# Patient Record
Sex: FEMALE | Race: WHITE | NOT HISPANIC OR LATINO | Employment: UNEMPLOYED | URBAN - NONMETROPOLITAN AREA
[De-identification: names, ages, dates, MRNs, and addresses within clinical notes are randomized per-mention and may not be internally consistent; named-entity substitution may affect disease eponyms.]

---

## 2017-01-04 ENCOUNTER — DOCUMENTATION (OUTPATIENT)
Dept: NEUROLOGY | Facility: CLINIC | Age: 42
End: 2017-01-04

## 2017-01-04 RX ORDER — LACOSAMIDE 100 MG/1
100 TABLET ORAL NIGHTLY
Qty: 30 TABLET | Refills: 2 | Status: SHIPPED | OUTPATIENT
Start: 2017-01-04 | End: 2017-03-24 | Stop reason: SDUPTHER

## 2017-01-04 RX ORDER — LACOSAMIDE 200 MG/1
200 TABLET ORAL EVERY 12 HOURS SCHEDULED
Qty: 60 TABLET | Refills: 5 | Status: SHIPPED | OUTPATIENT
Start: 2017-01-04 | End: 2017-01-04 | Stop reason: SDUPTHER

## 2017-01-04 RX ORDER — LACOSAMIDE 200 MG/1
200 TABLET ORAL EVERY 12 HOURS SCHEDULED
Qty: 60 TABLET | Refills: 2 | Status: SHIPPED | OUTPATIENT
Start: 2017-01-04 | End: 2017-03-24 | Stop reason: SDUPTHER

## 2017-02-28 RX ORDER — DIVALPROEX SODIUM 125 MG/1
500 CAPSULE, COATED PELLETS ORAL 2 TIMES DAILY
Qty: 240 CAPSULE | Refills: 4 | Status: SHIPPED | OUTPATIENT
Start: 2017-02-28 | End: 2017-05-02 | Stop reason: SDUPTHER

## 2017-03-24 RX ORDER — LACOSAMIDE 100 MG/1
100 TABLET ORAL NIGHTLY
Qty: 30 TABLET | Refills: 5 | Status: SHIPPED | OUTPATIENT
Start: 2017-03-24 | End: 2017-05-02 | Stop reason: SDUPTHER

## 2017-03-24 RX ORDER — LACOSAMIDE 200 MG/1
200 TABLET ORAL EVERY 12 HOURS SCHEDULED
Qty: 60 TABLET | Refills: 5 | Status: SHIPPED | OUTPATIENT
Start: 2017-03-24 | End: 2017-05-02 | Stop reason: SDUPTHER

## 2017-04-03 DIAGNOSIS — G40.909 SEIZURE DISORDER (HCC): ICD-10-CM

## 2017-04-03 RX ORDER — FOLIC ACID 1 MG/1
1 TABLET ORAL DAILY
Qty: 30 TABLET | Refills: 6 | Status: SHIPPED | OUTPATIENT
Start: 2017-04-03 | End: 2017-05-02 | Stop reason: SDUPTHER

## 2017-05-02 ENCOUNTER — OFFICE VISIT (OUTPATIENT)
Dept: NEUROLOGY | Facility: CLINIC | Age: 42
End: 2017-05-02

## 2017-05-02 VITALS
HEART RATE: 74 BPM | WEIGHT: 145 LBS | SYSTOLIC BLOOD PRESSURE: 118 MMHG | HEIGHT: 63 IN | DIASTOLIC BLOOD PRESSURE: 80 MMHG | BODY MASS INDEX: 25.69 KG/M2

## 2017-05-02 DIAGNOSIS — E03.9 HYPOTHYROIDISM, UNSPECIFIED TYPE: ICD-10-CM

## 2017-05-02 DIAGNOSIS — Z86.19 HX OF TOXOPLASMOSIS: ICD-10-CM

## 2017-05-02 DIAGNOSIS — R56.9 GENERALIZED CONVULSIVE SEIZURES (HCC): Primary | ICD-10-CM

## 2017-05-02 DIAGNOSIS — G40.909 SEIZURE DISORDER (HCC): ICD-10-CM

## 2017-05-02 PROCEDURE — 99213 OFFICE O/P EST LOW 20 MIN: CPT | Performed by: CLINICAL NURSE SPECIALIST

## 2017-05-02 RX ORDER — LACOSAMIDE 200 MG/1
200 TABLET ORAL EVERY 12 HOURS SCHEDULED
Qty: 60 TABLET | Refills: 5 | Status: SHIPPED | OUTPATIENT
Start: 2017-05-02 | End: 2017-10-26 | Stop reason: SDUPTHER

## 2017-05-02 RX ORDER — LACOSAMIDE 100 MG/1
100 TABLET ORAL NIGHTLY
Qty: 30 TABLET | Refills: 5 | Status: SHIPPED | OUTPATIENT
Start: 2017-05-02 | End: 2017-10-04 | Stop reason: SDUPTHER

## 2017-05-02 RX ORDER — LACOSAMIDE 200 MG/1
200 TABLET ORAL EVERY 12 HOURS SCHEDULED
Qty: 60 TABLET | Refills: 5 | Status: SHIPPED | OUTPATIENT
Start: 2017-05-02 | End: 2017-05-02 | Stop reason: SDUPTHER

## 2017-05-02 RX ORDER — DIVALPROEX SODIUM 125 MG/1
500 CAPSULE, COATED PELLETS ORAL 2 TIMES DAILY
Qty: 240 CAPSULE | Refills: 5 | Status: SHIPPED | OUTPATIENT
Start: 2017-05-02 | End: 2017-11-17 | Stop reason: SDUPTHER

## 2017-05-02 RX ORDER — FOLIC ACID 1 MG/1
1 TABLET ORAL DAILY
Qty: 30 TABLET | Refills: 6 | Status: SHIPPED | OUTPATIENT
Start: 2017-05-02 | End: 2017-11-27 | Stop reason: SDUPTHER

## 2017-09-14 ENCOUNTER — OFFICE VISIT (OUTPATIENT)
Dept: FAMILY MEDICINE CLINIC | Facility: CLINIC | Age: 42
End: 2017-09-14

## 2017-09-14 VITALS
OXYGEN SATURATION: 97 % | HEART RATE: 59 BPM | HEIGHT: 63 IN | WEIGHT: 122 LBS | TEMPERATURE: 97.6 F | DIASTOLIC BLOOD PRESSURE: 68 MMHG | BODY MASS INDEX: 21.62 KG/M2 | RESPIRATION RATE: 16 BRPM | SYSTOLIC BLOOD PRESSURE: 104 MMHG

## 2017-09-14 DIAGNOSIS — R32 URINARY INCONTINENCE, UNSPECIFIED TYPE: ICD-10-CM

## 2017-09-14 DIAGNOSIS — K59.00 CONSTIPATION, UNSPECIFIED CONSTIPATION TYPE: ICD-10-CM

## 2017-09-14 DIAGNOSIS — E03.9 ACQUIRED HYPOTHYROIDISM: Primary | ICD-10-CM

## 2017-09-14 DIAGNOSIS — R15.9 INCONTINENT OF FECES: ICD-10-CM

## 2017-09-14 DIAGNOSIS — IMO0001 WHEEL CHAIR AS AMBULATORY AID: ICD-10-CM

## 2017-09-14 DIAGNOSIS — G40.909 SEIZURE DISORDER (HCC): ICD-10-CM

## 2017-09-14 DIAGNOSIS — J30.89 SEASONAL ALLERGIC RHINITIS DUE TO OTHER ALLERGIC TRIGGER: ICD-10-CM

## 2017-09-14 PROCEDURE — 99203 OFFICE O/P NEW LOW 30 MIN: CPT | Performed by: FAMILY MEDICINE

## 2017-09-14 RX ORDER — FACIAL-BODY WIPES
EACH TOPICAL
Qty: 80 EACH | Refills: 12 | Status: SHIPPED | OUTPATIENT
Start: 2017-09-14

## 2017-09-14 RX ORDER — MULTIVITAMIN/IRON/FOLIC ACID 18MG-0.4MG
1 TABLET ORAL DAILY
Qty: 90 TABLET | Refills: 2 | Status: SHIPPED | OUTPATIENT
Start: 2017-09-14 | End: 2018-12-18

## 2017-09-14 RX ORDER — DIVALPROEX SODIUM 125 MG/1
500 CAPSULE, COATED PELLETS ORAL 2 TIMES DAILY
Qty: 240 CAPSULE | Refills: 5 | Status: CANCELLED | OUTPATIENT
Start: 2017-09-14

## 2017-09-14 RX ORDER — CETIRIZINE HYDROCHLORIDE 10 MG/1
10 TABLET ORAL DAILY
Qty: 90 TABLET | Refills: 2 | Status: SHIPPED | OUTPATIENT
Start: 2017-09-14 | End: 2017-10-26 | Stop reason: SDUPTHER

## 2017-09-14 RX ORDER — DIAPER,BRIEF,ADULT, DISPOSABLE
EACH MISCELLANEOUS
Qty: 32 EACH | Refills: 12 | Status: SHIPPED | OUTPATIENT
Start: 2017-09-14

## 2017-09-14 RX ORDER — POLYETHYLENE GLYCOL 3350 17 G/17G
17 POWDER, FOR SOLUTION ORAL DAILY PRN
Qty: 100 EACH | Refills: 1 | Status: SHIPPED | OUTPATIENT
Start: 2017-09-14 | End: 2017-12-01 | Stop reason: SDUPTHER

## 2017-09-14 RX ORDER — FOLIC ACID 1 MG/1
1 TABLET ORAL DAILY
Qty: 30 TABLET | Refills: 6 | Status: CANCELLED | OUTPATIENT
Start: 2017-09-14

## 2017-09-14 RX ORDER — LEVOTHYROXINE SODIUM 0.03 MG/1
25 TABLET ORAL DAILY
Qty: 90 TABLET | Refills: 2 | Status: SHIPPED | OUTPATIENT
Start: 2017-09-14 | End: 2018-03-16 | Stop reason: SDUPTHER

## 2017-09-14 RX ORDER — POLYETHYLENE GLYCOL 3350 17 G/17G
17 POWDER, FOR SOLUTION ORAL DAILY
Status: CANCELLED | OUTPATIENT
Start: 2017-09-14

## 2017-09-14 NOTE — PROGRESS NOTES
Chief Complaint   Patient presents with   • Med Refill     Patient needs medication refills . I have pended the medication for you. she is wanting to change the dosage of the miralax         History:  Nely Abbasi is a 41 y.o. female presents who today for evaluation of the above problems.  PCP currently listed as Shreyas Garcia MD.   Here with foster mother today Elsa Lulal.  Patient is new to practice. She has been going to [facility] and they are switching her over.  She needs refills of medications as listed to the side.  Mother had bad pregnancy, a lot of bleeding. Mother ended up with toxo while pregnant.  Patient has no issues at present, history of this problem, treated as youth.  The patient was in institute for 30 yr, from age 9 until 2014.  She flaps her seatbelt and rock/turn.  She will tear up paper at home. Has tried sensory objects.  Thurman did this for 30 years.  She will not do hand clappers.  Will not use fidget spinner or fidget cubes.  She likes to shake her seatbetl.  This will entertain her.  At home she will tear them and flap them and then do this repeatedly.  100% of history obtained from foster mother, patient is not verbal.  Never been sexually active.  Not considering paps/pelvics/screening evaluation.  Declines flu vaccine today. She is able to walk, does not like to follow.  Land snot do well and she will stand up and spin.  She hates to change her day. If going to restaurant not using chair.  She will go in and sit and eat.  If she has to wait she will get frustrated and will pull hair. Mother is guardian, goes through agency for current foster mother to have permission to be seen.  Patient has no c/o today.  Unknown ability to discover pain as patient does not really communicate.  Wears adult diapers. USes multiple per day. Neuro refills vimpat, depakote and folic acid. Last TSh 1.16 2016.     Nely Abbasi  has a past medical history of Seizures and  Toxoplasmosis.    Allergies   Allergen Reactions   • Keppra [Levetiracetam]    • Lamictal [Lamotrigine]    • Topamax [Topiramate]      Past Medical History:   Diagnosis Date   • Seizures    • Toxoplasmosis      Past Surgical History:   Procedure Laterality Date   • CHOLECYSTECTOMY       Family History   Problem Relation Age of Onset   • No Known Problems Mother    • No Known Problems Father    • No Known Problems Sister    • No Known Problems Sister    • No Known Problems Maternal Grandmother    • No Known Problems Maternal Grandfather    • No Known Problems Paternal Grandmother    • No Known Problems Paternal Grandfather        Current Outpatient Prescriptions on File Prior to Visit   Medication Sig Dispense Refill   • Calcium Carbonate-Vitamin D (CALCIUM 600+D3 PO) Take  by mouth daily.     • cetirizine (ZyrTEC) 10 MG tablet Take 10 mg by mouth daily.     • Divalproex Sodium (DEPAKOTE SPRINKLE) 125 MG capsule Take 4 capsules by mouth 2 (Two) Times a Day. 240 capsule 5   • folic acid (FOLVITE) 1 MG tablet Take 1 tablet by mouth Daily. 30 tablet 6   • lacosamide (VIMPAT) 100 MG tablet tablet Take 1 tablet by mouth Every Night. 30 tablet 5   • lacosamide (VIMPAT) 200 MG tablet Take 1 tablet by mouth Every 12 (Twelve) Hours. 60 tablet 5   • levothyroxine (SYNTHROID, LEVOTHROID) 25 MCG tablet Take 25 mcg by mouth daily.     • Multiple Vitamins-Minerals (CENTRUM ADULTS PO) Take  mg by mouth daily.     • polyethylene glycol (MIRALAX) packet Take 17 g by mouth daily.       No current facility-administered medications on file prior to visit.        Family history, surgical history, past medical history, Allergies and meds reviewed with patient today and updated in [facility] EMR.     ROS:  Review of Systems  Patient is not able to communicate.  No ability to obtain ROS.  Foster mother notes that she is her usual self.  Slapping belt strap in between hands/clapping.  Turning head side to side. Completely non communicative.  "    OBJECTIVE:  Vitals:    09/14/17 1343   BP: 104/68   Pulse: 59   Resp: 16   Temp: 97.6 °F (36.4 °C)   SpO2: 97%   Weight: 122 lb (55.3 kg)   Height: 63\" (160 cm)     Physical Exam   Constitutional: She appears well-developed and well-nourished. No distress.   HENT:   Head: Normocephalic and atraumatic.   Right Ear: External ear normal.   Left Ear: External ear normal.   Nose: Nose normal.   Mouth/Throat: Oropharynx is clear and moist.   Eyes: Conjunctivae and EOM are normal. Pupils are equal, round, and reactive to light.   Neck: Normal range of motion. Neck supple. No thyromegaly present.   Cardiovascular: Normal rate, regular rhythm, normal heart sounds and intact distal pulses.    Pulmonary/Chest: Effort normal and breath sounds normal. No respiratory distress. She has no rales. She exhibits no tenderness.   Abdominal: Soft. Bowel sounds are normal. There is no tenderness. There is no rebound and no guarding.   Musculoskeletal: Normal range of motion. She exhibits no tenderness.   MOst of exam completed in WC.  She has very little motion, very little mobility.  Tears up paper/ slaps seatbelt.     Lymphadenopathy:     She has no cervical adenopathy.     She has no axillary adenopathy.   Neurological: She is alert. She has normal strength and normal reflexes. No sensory deficit. Coordination normal.   Skin: Skin is warm and dry. No rash noted. She is not diaphoretic.   Psychiatric: She is slowed. Cognition and memory are impaired. She is noncommunicative.   No eye contact.  No communication. Unable to follow commands.  She is able to ambulate but not able to converse/interact. .  She claps her seatbelt between hands.  Makes some squawking like noises.    Nursing note and vitals reviewed.    Assessment/Plan:  Very limited ability to obtain information. Exam for heent, GI, Pulm, Cardiology appears normal.  She does not appear to be in distress.  Chronic inability to communicate, seizures by history, hypothyroid by " history, status unknown.  She is currently on synthroid.  Foster mother did not want any labs today.  She needs Rx for adult daipers, wipes and miralax.  Additionally she needs refills of her zyrtec, calcium +D, very difficult history/examination with patient history.  Neuro refills depakote. No recent seizures.  She is essentially stable.     Orders Placed Today:  Nely was seen today for med refill.    Diagnoses and all orders for this visit:    Acquired hypothyroidism  -     levothyroxine (SYNTHROID, LEVOTHROID) 25 MCG tablet; Take 1 tablet by mouth Daily.    Seizure disorder    Incontinent of feces  -     Incontinence Supply Disposable (DEPEND UNDERWEAR SM/MED) misc; 6 briefs per day  -     Infant Care Products (BABY WIPES) misc; 1 pack per week.    Urinary incontinence, unspecified type  -     Incontinence Supply Disposable (DEPEND UNDERWEAR SM/MED) misc; 6 briefs per day  -     Infant Care Products (BABY WIPES) misc; 1 pack per week.    Constipation, unspecified constipation type  -     polyethylene glycol (MIRALAX) packet; Take 17 g by mouth Daily As Needed (constipation).    Wheel chair as ambulatory aid    Seasonal allergic rhinitis due to other allergic trigger  -     cetirizine (zyrTEC) 10 MG tablet; Take 1 tablet by mouth Daily.    Other orders  -     Divalproex Sodium (DEPAKOTE SPRINKLE) 125 MG capsule; Take 4 capsules by mouth 2 (Two) Times a Day.  -     : folic acid (FOLVITE) 1 MG tablet; Take 1 tablet by mouth Daily.  -    polyethylene glycol (MIRALAX) packet; Take 17 g by mouth Daily.  -     Calcium Carb-Cholecalciferol (CALCIUM 600+D3) 600-200 MG-UNIT tablet; Take 1 tablet by mouth Daily.  -     Multiple Vitamins-Minerals (CENTRUM ADULTS) tablet; Take 1 each by mouth Daily.        Risks/benefits of current and new medications discussed with the patient and or family today.  The patient/family are aware and accept that if there any side effects they should call or return to clinic as soon as  possible.  Appropriate F/U discussed for topics addressed today. All questions were answered to the satisfactory state of patient/family.  Should symptoms fail to improve or worsen they agree to call or return to clinic or to go to the ER. Education handouts were offered on any new Rx if requested.  Discussed the importance of following up with any needed screening tests/labs/specialist appointments and any requested follow-up recommended by me today.  Importance of maintaining follow-up discussed and patient accepts that missed appointments can delay diagnosis and potentially lead to worsening of conditions.    An After Visit Summary was printed and given to the patient at discharge.  Follow-up: Return if symptoms worsen or fail to improve.         Shreyas Garcia M.D. 9/14/2017

## 2017-10-04 RX ORDER — LACOSAMIDE 100 MG/1
TABLET, FILM COATED ORAL
Qty: 30 TABLET | Refills: 2 | Status: SHIPPED | OUTPATIENT
Start: 2017-10-04 | End: 2017-10-26 | Stop reason: SDUPTHER

## 2017-10-26 RX ORDER — LACOSAMIDE 100 MG/1
100 TABLET ORAL
Qty: 30 TABLET | Refills: 2 | Status: SHIPPED | OUTPATIENT
Start: 2017-10-26 | End: 2018-01-24 | Stop reason: SDUPTHER

## 2017-10-26 RX ORDER — LACOSAMIDE 200 MG/1
200 TABLET ORAL EVERY 12 HOURS SCHEDULED
Qty: 60 TABLET | Refills: 2 | Status: SHIPPED | OUTPATIENT
Start: 2017-10-26 | End: 2018-01-24 | Stop reason: SDUPTHER

## 2017-10-26 RX ORDER — CETIRIZINE HYDROCHLORIDE 10 MG/1
10 TABLET ORAL DAILY
Qty: 90 TABLET | Refills: 2 | Status: SHIPPED | OUTPATIENT
Start: 2017-10-26 | End: 2018-11-07 | Stop reason: SDUPTHER

## 2017-11-17 RX ORDER — DIVALPROEX SODIUM 125 MG/1
CAPSULE, COATED PELLETS ORAL
Qty: 240 CAPSULE | Refills: 5 | Status: SHIPPED | OUTPATIENT
Start: 2017-11-17 | End: 2018-05-22 | Stop reason: SDUPTHER

## 2017-11-20 ENCOUNTER — OFFICE VISIT (OUTPATIENT)
Dept: NEUROLOGY | Facility: CLINIC | Age: 42
End: 2017-11-20

## 2017-11-20 VITALS
HEIGHT: 63 IN | HEART RATE: 64 BPM | DIASTOLIC BLOOD PRESSURE: 68 MMHG | WEIGHT: 122 LBS | SYSTOLIC BLOOD PRESSURE: 110 MMHG | BODY MASS INDEX: 21.62 KG/M2

## 2017-11-20 DIAGNOSIS — R56.9 GENERALIZED CONVULSIVE SEIZURES (HCC): Primary | ICD-10-CM

## 2017-11-20 DIAGNOSIS — Z79.899 MEDICATION MANAGEMENT: ICD-10-CM

## 2017-11-20 PROCEDURE — 99213 OFFICE O/P EST LOW 20 MIN: CPT | Performed by: CLINICAL NURSE SPECIALIST

## 2017-11-20 NOTE — PATIENT INSTRUCTIONS
Epilepsy  Epilepsy is a disorder in which a person has repeated seizures over time. A seizure is a release of abnormal electrical activity in the brain. Seizures can cause a change in attention, behavior, or the ability to remain awake and alert (altered mental status). Seizures often involve uncontrollable shaking (convulsions).   Most people with epilepsy lead normal lives. However, people with epilepsy are at an increased risk of falls, accidents, and injuries. Therefore, it is important to begin treatment right away.  CAUSES   Epilepsy has many possible causes. Anything that disturbs the normal pattern of brain cell activity can lead to seizures. This may include:   · Head injury.  · Birth trauma.  · High fever as a child.  · Stroke.  · Bleeding into or around the brain.  · Certain drugs.  · Prolonged low oxygen, such as what occurs after CPR efforts.  · Abnormal brain development.  · Certain illnesses, such as meningitis, encephalitis (brain infection), malaria, and other infections.  · An imbalance of nerve signaling chemicals (neurotransmitters).    SIGNS AND SYMPTOMS   The symptoms of a seizure can vary greatly from one person to another. Right before a seizure, you may have a warning (aura) that a seizure is about to occur. An aura may include the following symptoms:  · Fear or anxiety.  · Nausea.  · Feeling like the room is spinning (vertigo).  · Vision changes, such as seeing flashing lights or spots.  Common symptoms during a seizure include:  · Abnormal sensations, such as an abnormal smell or a bitter taste in the mouth.    · Sudden, general body stiffness.    · Convulsions that involve rhythmic jerking of the face, arm, or leg on one or both sides.    · Sudden change in consciousness.      Appearing to be awake but not responding.      Appearing to be asleep but cannot be awakened.    · Grimacing, chewing, lip smacking, drooling, tongue biting, or loss of bowel or bladder control.  After a seizure,  you may feel sleepy for a while.   DIAGNOSIS   Your health care provider will ask about your symptoms and take a medical history. Descriptions from any witnesses to your seizures will be very helpful in the diagnosis. A physical exam, including a detailed neurological exam, is necessary. Various tests may be done, such as:   · An electroencephalogram (EEG). This is a painless test of your brain waves. In this test, a diagram is created of your brain waves. These diagrams can be interpreted by a specialist.  · An MRI of the brain.    · A CT scan of the brain.    · A spinal tap (lumbar puncture, LP).  · Blood tests to check for signs of infection or abnormal blood chemistry.  TREATMENT   There is no cure for epilepsy, but it is generally treatable. Once epilepsy is diagnosed, it is important to begin treatment as soon as possible. For most people with epilepsy, seizures can be controlled with medicines. The following may also be used:  · A pacemaker for the brain (vagus nerve stimulator) can be used for people with seizures that are not well controlled by medicine.  · Surgery on the brain.  For some people, epilepsy eventually goes away.  HOME CARE INSTRUCTIONS   ·  Follow your health care provider's recommendations on driving and safety in normal activities.  · Get enough rest. Lack of sleep can cause seizures.  · Only take over-the-counter or prescription medicines as directed by your health care provider. Take any prescribed medicine exactly as directed.  · Avoid any known triggers of your seizures.  · Keep a seizure diary. Record what you recall about any seizure, especially any possible trigger.    · Make sure the people you live and work with know that you are prone to seizures. They should receive instructions on how to help you. In general, a witness to a seizure should:      Cushion your head and body.      Turn you on your side.      Avoid unnecessarily restraining you.      Not place anything inside your  mouth.      Call for emergency medical help if there is any question about what has occurred.    · Follow up with your health care provider as directed. You may need regular blood tests to monitor the levels of your medicine.    SEEK MEDICAL CARE IF:   · You develop signs of infection or other illness. This might increase the risk of a seizure.    · You seem to be having more frequent seizures.    · Your seizure pattern is changing.    SEEK IMMEDIATE MEDICAL CARE IF:   · You have a seizure that does not stop after a few moments.    · You have a seizure that causes any difficulty in breathing.    · You have a seizure that results in a very severe headache.    · You have a seizure that leaves you with the inability to speak or use a part of your body.       This information is not intended to replace advice given to you by your health care provider. Make sure you discuss any questions you have with your health care provider.     Document Released: 12/18/2006 Document Revised: 04/10/2017 Document Reviewed: 07/30/2014  Elsevier Interactive Patient Education ©2017 [employer]

## 2017-11-20 NOTE — PROGRESS NOTES
"Subjective     Chief Complaint   Patient presents with   • Seizures     No sz. Questions about \"the laughing seizure\"       Nely Abbasi is a 42 y.o. female right handed on disability.  She is here today for follow up for seizures. She was last seen 5/2017. She is accompanied by her caregiver. She has history of living in an institute from age 9 for 30 years.  As far as the caregiver is aware the patient has not had a seizure since 2015. She does tell me the patient has had two episodes of jocularity. One occurred in her sleep. The other occurred during day time and she had a laughing spell followed by a 10 second or less blank stare and then returned to baseline. She has no new complaints today.  Last labs were 5/2017 but I do not have those results       HPI Comments: According to the caregiver, the patient was institutionalized at a facility in [place], [place] at age 9 for about 30 years and then sent to a group home.    Seizures    This is a chronic problem. Episode onset: last reported seizure 2015. Associated symptoms include sleepiness, confusion and visual disturbances (dysconjugate gaze). Pertinent negatives include no headaches, no cough, no nausea, no vomiting and no diarrhea. Characteristics include rhythmic jerking and loss of consciousness.        Current Outpatient Prescriptions   Medication Sig Dispense Refill   • Calcium Carb-Cholecalciferol (CALCIUM 600+D3) 600-200 MG-UNIT tablet Take 1 tablet by mouth Daily. 90 each 2   • cetirizine (zyrTEC) 10 MG tablet Take 1 tablet by mouth Daily. 90 tablet 2   • Divalproex Sodium (DEPAKOTE SPRINKLE) 125 MG capsule TAKE 4 CAPSULES BY MOUTH 2 (TWO) TIMES A DAY. 240 capsule 5   • folic acid (FOLVITE) 1 MG tablet Take 1 tablet by mouth Daily. 30 tablet 6   • Incontinence Supply Disposable (DEPEND UNDERWEAR SM/MED) misc 6 briefs per day 32 each 12   • Infant Care Products (BABY WIPES) misc 1 pack per week. 80 each 12   • lacosamide (VIMPAT) 100 MG tablet tablet " "Take 1 tablet by mouth every night at bedtime. 30 tablet 2   • lacosamide (VIMPAT) 200 MG tablet Take 1 tablet by mouth Every 12 (Twelve) Hours. 60 tablet 2   • levothyroxine (SYNTHROID, LEVOTHROID) 25 MCG tablet Take 1 tablet by mouth Daily. 90 tablet 2   • Multiple Vitamins-Minerals (CENTRUM ADULTS) tablet Take 1 each by mouth Daily. 90 tablet 2   • polyethylene glycol (MIRALAX) packet Take 17 g by mouth Daily As Needed (constipation). 100 each 1     No current facility-administered medications for this visit.        Past Medical History:   Diagnosis Date   • Blind    • Developmental delay    • Seizure    • Seizures    • Toxoplasmosis    • Toxoplasmosis        No past surgical history on file.    family history includes No Known Problems in her father, maternal grandfather, maternal grandmother, mother, paternal grandfather, paternal grandmother, sister, and sister.    Social History   Substance Use Topics   • Smoking status: Never Smoker   • Smokeless tobacco: Never Used   • Alcohol use No       Review of Systems   Constitutional: Negative for fatigue and fever.   HENT: Negative for rhinorrhea and trouble swallowing.    Eyes: Positive for visual disturbance (dysconjugate gaze).   Respiratory: Negative for cough and choking.    Cardiovascular: Negative for leg swelling.   Gastrointestinal: Positive for constipation. Negative for diarrhea, nausea and vomiting.   Genitourinary: Negative for dysuria and frequency.        Hx of incontinence. Wears depends   Musculoskeletal: Positive for gait problem. Negative for arthralgias and myalgias.   Neurological: Positive for seizures and loss of consciousness. Negative for dizziness, weakness and headaches.   Hematological: Negative for adenopathy.   Psychiatric/Behavioral: Positive for confusion. Negative for agitation and hallucinations.       Objective     /68  Pulse 64  Ht 63\" (160 cm)  Wt 122 lb (55.3 kg)  BMI 21.61 kg/m2, Body mass index is 21.61 " kg/(m^2).    Physical Exam   Constitutional: She appears well-developed and well-nourished.   HENT:   Head: Normocephalic.   Right Ear: External ear normal.   Left Ear: External ear normal.   Nose: Nose normal.   Mouth/Throat: Oropharynx is clear and moist.   Eyes: Conjunctivae are normal. Pupils are equal, round, and reactive to light. Right eye exhibits no nystagmus. Left eye exhibits no nystagmus.   Dysconjugate gaze   Neck: Normal range of motion. Neck supple. No JVD present.   Cardiovascular: Normal rate, regular rhythm and normal heart sounds.  Exam reveals no gallop and no friction rub.    No murmur heard.  Pulmonary/Chest: Effort normal and breath sounds normal. She has no wheezes. She has no rales.   Abdominal: Soft. Bowel sounds are normal. There is no tenderness.   Musculoskeletal: She exhibits no edema.   Lymphadenopathy:     She has no cervical adenopathy.   Neurological: She is alert. She is disoriented (non verbal, not able to follow commands). She displays no tremor. No cranial nerve deficit.   Reflex Scores:       Tricep reflexes are 1+ on the right side and 1+ on the left side.       Bicep reflexes are 1+ on the right side and 1+ on the left side.       Brachioradialis reflexes are 1+ on the right side and 1+ on the left side.       Patellar reflexes are 1+ on the right side and 1+ on the left side.  Moves upper and lower extremities, caregiver reports she can walk, but in a wheelchair today  Caregiver states she can feed herself some food types   Skin: Skin is warm and dry.   Psychiatric:   Sitting in wheelchair, moving a strap constantly, unable to follow directions            ASSESSMENT/PLAN    Diagnoses and all orders for this visit:    Generalized convulsive seizures    Medication management  -     Valproic Acid Level, Total; Future  -     Lacosamide Blood; Future  -     CBC & Differential; Future  -     Comprehensive Metabolic Panel; Future      MEDICAL DECISION MAKIN. Continue with  depakote sprinkles 125 mg 4 capsules BID  2. Continue with vimpat 200 mg BID and 100mg at HS  3. Obtain labs, depakote level, vimpat level, CBC, CMP, done 5/2017 and repeat labs again and ok to be done at PCP office.   4. Seizure precautions were discussed to include no tub baths, no swimming, avoiding lack of sleep, and avoiding known triggers. Education given of things that may contribute to a seizure to include, but not limited to: stressful situations, fever, fatigue, lack of sleep, low blood sugar, hyperventilation, flashing lights, and caffeine. Instructions given to take seizure medications as prescribed. Education given to family member on what to do during a seizure and care following the seizure. Education given to contact this office prior to stopping or changing any medications.  5. If episodes of jocularity become more frequent, caregiver is to let me know.  6. Does not use tobacco  7. Healthy BMI   allergies and all known medications/prescriptions have been reviewed using resources available on this encounter.    Return in about 6 months (around 5/20/2018).        SOWMYA Colby

## 2017-11-27 DIAGNOSIS — G40.909 SEIZURE DISORDER (HCC): ICD-10-CM

## 2017-11-27 RX ORDER — FOLIC ACID 1 MG/1
TABLET ORAL
Qty: 30 TABLET | Refills: 11 | Status: SHIPPED | OUTPATIENT
Start: 2017-11-27 | End: 2019-01-22 | Stop reason: SDUPTHER

## 2017-12-01 ENCOUNTER — OFFICE VISIT (OUTPATIENT)
Dept: FAMILY MEDICINE CLINIC | Facility: CLINIC | Age: 42
End: 2017-12-01

## 2017-12-01 VITALS
TEMPERATURE: 97.8 F | SYSTOLIC BLOOD PRESSURE: 116 MMHG | DIASTOLIC BLOOD PRESSURE: 80 MMHG | OXYGEN SATURATION: 99 % | RESPIRATION RATE: 16 BRPM | HEIGHT: 63 IN | HEART RATE: 99 BPM | WEIGHT: 114.2 LBS | BODY MASS INDEX: 20.23 KG/M2

## 2017-12-01 DIAGNOSIS — Z79.899 MEDICATION MANAGEMENT: ICD-10-CM

## 2017-12-01 DIAGNOSIS — R56.9 GENERALIZED CONVULSIVE SEIZURES (HCC): ICD-10-CM

## 2017-12-01 DIAGNOSIS — Z76.89 ESTABLISHING CARE WITH NEW DOCTOR, ENCOUNTER FOR: ICD-10-CM

## 2017-12-01 DIAGNOSIS — E03.9 ACQUIRED HYPOTHYROIDISM: Primary | ICD-10-CM

## 2017-12-01 DIAGNOSIS — K59.00 CONSTIPATION, UNSPECIFIED CONSTIPATION TYPE: ICD-10-CM

## 2017-12-01 PROCEDURE — 99213 OFFICE O/P EST LOW 20 MIN: CPT | Performed by: FAMILY MEDICINE

## 2017-12-01 RX ORDER — POLYETHYLENE GLYCOL 3350 17 G/17G
17 POWDER, FOR SOLUTION ORAL DAILY PRN
Qty: 100 EACH | Refills: 1 | Status: SHIPPED | OUTPATIENT
Start: 2017-12-01 | End: 2018-03-27 | Stop reason: SDUPTHER

## 2017-12-01 NOTE — PROGRESS NOTES
Subjective cc: wellness exam   Nely Abbasi is a 42 y.o. female who presents to hospitals care today.  Patient is a 42-year-old female who is currently in adult foster care.  Patient is nonverbal.  She is accompanied today by her foster mother.  All the history is obtained from the foster mother.  She reports that the patient has been disabled since birth.  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  Patient was in an institution for approximately 30 years.  At that time she was told that she did not belong in the institution and was taken out.  The patient's mother could not care for her and she was placed in adult foster care.  Patient lived in an adult home at that time.  Her current foster mother worked at the group home.  There was an incident where the patient left the group home and was found several houses down on someones front porch.  It is the foster mothers belief that she would have been unsupervised for an extended period of time.  At that time they did an emergency withdrawal from the group home.  Patient now lives with her foster mother.  The foster mother reports that she does well at home.  She is able to ambulate about the house.  She spins when she walks.  She can follow some basic commands although very little.  She can usually feed herself.  She is nonverbal.  She does use adult diapers.  The foster mother changes her frequently throughout the day.  If she notices any smear of stool in the diaper they will go to the toilet.  Patient is usually able to have a bowel movement after sitting on the toilet.  Patient does take MiraLAX as needed for constipation.  Mother needs a refill on this today.  Patient has a history of seizures which are controlled with current medications.  These medications are managed by neurology.   We discussed screening such as mammogram and Pap smears.  The foster mother declines these screenings.  She does not want to put the patient through that.  Patient  "has never been sexually active.    Constipation   This is a chronic problem. The current episode started more than 1 year ago. The problem is unchanged. She does not exercise regularly. Pertinent negatives include no abdominal pain, anorexia, back pain, bloating, diarrhea, difficulty urinating, fever, flatus, hematochezia, hemorrhoids, melena, nausea, rectal pain, vomiting or weight loss. Risk factors include immobility. She has tried fiber for the symptoms. The treatment provided moderate relief. There is no history of abdominal surgery, endocrine disease, inflammatory bowel disease, irritable bowel syndrome or metabolic disease.        The following portions of the patient's history were reviewed and updated as appropriate: allergies, current medications, past family history, past medical history, past social history, past surgical history and problem list.        Review of Systems   Unable to perform ROS: Patient nonverbal   Constitutional: Negative for fever and weight loss.   Gastrointestinal: Positive for constipation. Negative for abdominal pain, anorexia, bloating, diarrhea, flatus, hematochezia, hemorrhoids, melena, nausea, rectal pain and vomiting.   Genitourinary: Negative for difficulty urinating.   Musculoskeletal: Negative for back pain.       Objective   Blood pressure 116/80, pulse 99, temperature 97.8 °F (36.6 °C), resp. rate 16, height 160 cm (63\"), weight 51.8 kg (114 lb 3.2 oz), SpO2 99 %.  Physical Exam   Constitutional: She appears well-nourished.  Non-toxic appearance. She does not have a sickly appearance. She does not appear ill. No distress.   Patient in wheelchair.  She continually flaps the seatbelt.  She is nonverbal.  She will swing her head from side to side.   HENT:   Head: Atraumatic.   Nose: Nose normal.   Mouth/Throat: Oropharynx is clear and moist and mucous membranes are normal.   Eyes: Conjunctivae are normal.   Patient cannot follow commands to test her ocular motion.  However " "she is looking about the room and there does not appear to be any deficits.   Neck: No tracheal deviation present. No thyromegaly present.   Cardiovascular: Regular rhythm and normal heart sounds.    Pulmonary/Chest: Effort normal and breath sounds normal. No stridor. No respiratory distress. She has no wheezes. She exhibits no tenderness.   Abdominal: Soft. Bowel sounds are normal. She exhibits no distension. There is no tenderness. There is no guarding.   Musculoskeletal: She exhibits no edema.   Lymphadenopathy:     She has no cervical adenopathy.   Neurological: She is alert.   Skin: Skin is warm and dry. No rash noted. She is not diaphoretic. No erythema. There is pallor.   Nursing note and vitals reviewed.      Assessment/Plan   Problems Addressed this Visit        Endocrine    Hypothyroidism - Primary    Relevant Orders    TSH       Nervous and Auditory    Generalized convulsive seizures       Other    Medication management      Other Visit Diagnoses     Constipation, unspecified constipation type        Relevant Medications    polyethylene glycol (MIRALAX) packet    Establishing care with new doctor, encounter for              Plan:    #1 hypothyroidism: Patient is to have blood work done today for her neurologist.  I have added on a TSH to ensure the patient is on the appropriate dose of Synthroid.  Will notify the foster mother of the results when available.  Patient has refills on her thyroid medication.  A new prescription will not be needed unless dosage is changed.    #2 seizures: Patient to have blood work done for her neurologist today.  Patient follows with neurology.  Her seizures are well controlled on current medications.  Management will be deferred to neurology.    #3 chronic constipation: Patient takes MiraLAX as needed.  Mother requests a new prescription for this.  It was recently sent in however mother thinks that it does not say \"as needed.\"  The new prescription will read to \"take as " "needed for constipation.\"    #4 establish care for medication management: I discussed with foster mom all of the screening that would be appropriate given the patient's age.  This included immunizations, Pap smears, and mammogram.  Adult foster mother declines these screenings at this time.  She states that she does not want to put the patient through these.  I feel this is reasonable as the patient would likely not tolerate them well.    Return for annual wellness visit          This document has been electronically signed by Kylie Higuera MD on December 5, 2017 3:03 PM           "

## 2017-12-07 DIAGNOSIS — Z79.899 MEDICATION MANAGEMENT: ICD-10-CM

## 2018-01-24 RX ORDER — LACOSAMIDE 100 MG/1
100 TABLET ORAL
Qty: 30 TABLET | Refills: 2 | Status: SHIPPED | OUTPATIENT
Start: 2018-01-24 | End: 2018-04-05 | Stop reason: SDUPTHER

## 2018-01-24 RX ORDER — LACOSAMIDE 200 MG/1
200 TABLET ORAL EVERY 12 HOURS SCHEDULED
Qty: 60 TABLET | Refills: 2 | Status: SHIPPED | OUTPATIENT
Start: 2018-01-24 | End: 2018-04-05 | Stop reason: SDUPTHER

## 2018-03-16 DIAGNOSIS — E03.9 ACQUIRED HYPOTHYROIDISM: ICD-10-CM

## 2018-03-16 RX ORDER — LEVOTHYROXINE SODIUM 0.03 MG/1
25 TABLET ORAL DAILY
Qty: 90 TABLET | Refills: 2 | Status: SHIPPED | OUTPATIENT
Start: 2018-03-16 | End: 2018-12-20 | Stop reason: SDUPTHER

## 2018-03-27 DIAGNOSIS — K59.00 CONSTIPATION, UNSPECIFIED CONSTIPATION TYPE: ICD-10-CM

## 2018-03-27 RX ORDER — POLYETHYLENE GLYCOL 3350 17 G/17G
17 POWDER, FOR SOLUTION ORAL DAILY
Qty: 100 EACH | Refills: 1 | Status: SHIPPED | OUTPATIENT
Start: 2018-03-27 | End: 2018-04-23 | Stop reason: SDUPTHER

## 2018-03-27 NOTE — TELEPHONE ENCOUNTER
Patient care taker is needing this rx to  so that the state can review the order because it is a PRN medication. Please review and let me know when the rx is approved and printed and I will notify caregiver when to .

## 2018-04-05 RX ORDER — LACOSAMIDE 200 MG/1
200 TABLET ORAL EVERY 12 HOURS SCHEDULED
Qty: 60 TABLET | Refills: 2 | Status: SHIPPED | OUTPATIENT
Start: 2018-04-05 | End: 2018-07-09 | Stop reason: SDUPTHER

## 2018-04-05 RX ORDER — LACOSAMIDE 100 MG/1
100 TABLET ORAL
Qty: 30 TABLET | Refills: 2 | OUTPATIENT
Start: 2018-04-05 | End: 2018-07-09 | Stop reason: SDUPTHER

## 2018-04-23 DIAGNOSIS — K59.00 CONSTIPATION, UNSPECIFIED CONSTIPATION TYPE: ICD-10-CM

## 2018-04-23 RX ORDER — POLYETHYLENE GLYCOL 3350 17 G/17G
17 POWDER, FOR SOLUTION ORAL DAILY
Qty: 100 EACH | Refills: 1 | Status: SHIPPED | OUTPATIENT
Start: 2018-04-23 | End: 2018-12-03 | Stop reason: SDUPTHER

## 2018-05-21 ENCOUNTER — TELEPHONE (OUTPATIENT)
Dept: NEUROLOGY | Facility: CLINIC | Age: 43
End: 2018-05-21

## 2018-05-21 NOTE — TELEPHONE ENCOUNTER
Left message reminding Ms Abbasi of her appointment tomorrow at 1040 am with Jami DENG, also advised if she had any questions or needed to reschedule to please call the office at [phone]

## 2018-05-22 ENCOUNTER — OFFICE VISIT (OUTPATIENT)
Dept: NEUROLOGY | Facility: CLINIC | Age: 43
End: 2018-05-22

## 2018-05-22 VITALS
WEIGHT: 122 LBS | DIASTOLIC BLOOD PRESSURE: 78 MMHG | RESPIRATION RATE: 16 BRPM | HEART RATE: 76 BPM | BODY MASS INDEX: 21.62 KG/M2 | SYSTOLIC BLOOD PRESSURE: 118 MMHG | HEIGHT: 63 IN

## 2018-05-22 DIAGNOSIS — Z51.81 THERAPEUTIC DRUG MONITORING: Primary | ICD-10-CM

## 2018-05-22 DIAGNOSIS — R56.9 GENERALIZED CONVULSIVE SEIZURES (HCC): ICD-10-CM

## 2018-05-22 PROCEDURE — 99213 OFFICE O/P EST LOW 20 MIN: CPT | Performed by: CLINICAL NURSE SPECIALIST

## 2018-05-22 RX ORDER — DIVALPROEX SODIUM 125 MG/1
125 CAPSULE, COATED PELLETS ORAL EVERY 12 HOURS SCHEDULED
Qty: 240 CAPSULE | Refills: 5 | Status: SHIPPED | OUTPATIENT
Start: 2018-05-22 | End: 2018-05-24 | Stop reason: SDUPTHER

## 2018-05-22 NOTE — PROGRESS NOTES
Subjective     Chief Complaint   Patient presents with   • Follow-up     6 mo f/u.  Caregiver said repeat labs were done at PCP.  Caregiver states everything has been fine. No seizure episodes. No medication issues.          Nely Abbasi is a 42 y.o. female right handed on disability.  She is here today for follow up for seizures. She was last seen 11/2017. She is accompanied by her caregiver. She has history of living in an institute from age 9 for 30 years.  As far as the caregiver is aware the patient has not had a seizure since 2015.  She has no new complaints today.  last depakote level was 81 and vimpat level was 13.6. CBC, CMP were WNL.     According to the caregiver, the patient was institutionalized at a facility in [place], [place] at age 9 for about 30 years and then sent to a group home.      Seizures    This is a chronic problem. Episode onset: last reported seizure 2015. Associated symptoms include sleepiness, confusion and visual disturbances (dysconjugate gaze). Pertinent negatives include no headaches, no cough, no nausea, no vomiting and no diarrhea. Characteristics include rhythmic jerking and loss of consciousness.        Current Outpatient Prescriptions   Medication Sig Dispense Refill   • Calcium Carb-Cholecalciferol (CALCIUM 600+D3) 600-200 MG-UNIT tablet Take 1 tablet by mouth Daily. 90 each 2   • cetirizine (zyrTEC) 10 MG tablet Take 1 tablet by mouth Daily. 90 tablet 2   • [employer] SUNSCREEN SPF 30 lotion Apply sunscreen every 2 hours when in the sun. 237 mL 0   • Divalproex Sodium (DEPAKOTE SPRINKLE) 125 MG capsule Take 1 capsule by mouth Every 12 (Twelve) Hours. Take 4 capsules every 12 hours 240 capsule 5   • folic acid (FOLVITE) 1 MG tablet TAKE ONE TABLET BY MOUTH EVERY DAY 30 tablet 11   • Incontinence Supply Disposable (DEPEND UNDERWEAR SM/MED) misc 6 briefs per day 32 each 12   • Infant Care Products (BABY WIPES) misc 1 pack per week. 80 each 12   • lacosamide (VIMPAT) 100 MG tablet  tablet Take 1 tablet by mouth every night at bedtime. 30 tablet 2   • lacosamide (VIMPAT) 200 MG tablet Take 1 tablet by mouth Every 12 (Twelve) Hours. 60 tablet 2   • levothyroxine (SYNTHROID, LEVOTHROID) 25 MCG tablet Take 1 tablet by mouth Daily. 90 tablet 2   • Multiple Vitamins-Minerals (CENTRUM ADULTS) tablet Take 1 each by mouth Daily. 90 tablet 2   • polyethylene glycol (MIRALAX) packet Take 17 g by mouth Daily. Patient is to take for constipation if no bowel within 3 days patient is to take daily until results. 100 each 1     No current facility-administered medications for this visit.        Past Medical History:   Diagnosis Date   • Blind    • Developmental delay    • Seizure    • Seizures    • Toxoplasmosis    • Toxoplasmosis        No past surgical history on file.    family history includes No Known Problems in her father, maternal grandfather, maternal grandmother, mother, paternal grandfather, paternal grandmother, sister, and sister.    Social History   Substance Use Topics   • Smoking status: Never Smoker   • Smokeless tobacco: Never Used   • Alcohol use No       Review of Systems   Constitutional: Negative for fatigue and fever.   HENT: Negative for rhinorrhea and trouble swallowing.    Eyes: Positive for visual disturbance (dysconjugate gaze).   Respiratory: Negative for cough and choking.    Cardiovascular: Negative for leg swelling.   Gastrointestinal: Positive for constipation. Negative for diarrhea, nausea and vomiting.   Genitourinary: Negative for dysuria and frequency.        Hx of incontinence. Wears depends   Musculoskeletal: Positive for gait problem. Negative for arthralgias and myalgias.   Neurological: Positive for seizures and loss of consciousness. Negative for dizziness, weakness and headaches.   Hematological: Negative for adenopathy.   Psychiatric/Behavioral: Positive for confusion. Negative for agitation and hallucinations.       Objective     /78 (BP Location: Right arm,  "Patient Position: Sitting, Cuff Size: Adult)   Pulse 76   Resp 16   Ht 160 cm (63\")   Wt 55.3 kg (122 lb)   BMI 21.61 kg/m² , Body mass index is 21.61 kg/m².    Physical Exam   Constitutional: Vital signs are normal. She appears well-developed and well-nourished. She is cooperative.   HENT:   Head: Normocephalic.   Right Ear: External ear normal.   Left Ear: External ear normal.   Nose: Nose normal.   Mouth/Throat: Oropharynx is clear and moist.   Eyes: Conjunctivae are normal. Pupils are equal, round, and reactive to light. Right eye exhibits no nystagmus. Left eye exhibits no nystagmus.   Dysconjugate gaze   Neck: Normal range of motion. Neck supple. No JVD present.   Cardiovascular: Normal rate, regular rhythm and normal heart sounds.  Exam reveals no gallop and no friction rub.    No murmur heard.  Pulmonary/Chest: Effort normal and breath sounds normal. She has no decreased breath sounds. She has no wheezes. She has no rhonchi. She has no rales.   Abdominal: Soft. Bowel sounds are normal. There is no tenderness.   Musculoskeletal: She exhibits no edema.   Lymphadenopathy:     She has no cervical adenopathy.   Neurological: She is alert. She is disoriented (non verbal, not able to follow commands). She displays no tremor. No cranial nerve deficit. Gait (wide based gait) abnormal. Coordination normal.   Reflex Scores:       Tricep reflexes are 1+ on the right side and 1+ on the left side.       Bicep reflexes are 1+ on the right side and 1+ on the left side.       Brachioradialis reflexes are 1+ on the right side and 1+ on the left side.       Patellar reflexes are 1+ on the right side and 1+ on the left side.  Moves upper and lower extremities, gait unsteady at times. Wide based gait. Caregiver states she can feed herself some food types   Skin: Skin is warm and dry.   Psychiatric:   Sitting in wheelchair, moving a strap constantly, unable to follow directions       Results for orders placed or performed in " visit on 09/22/16   Comprehensive Metabolic Panel   Result Value Ref Range    Glucose 87 70 - 100 mg/dL    BUN 20 5 - 21 mg/dL    Creatinine 0.55 0.50 - 1.40 mg/dL    Sodium 142 135 - 145 mmol/L    Potassium 4.2 3.5 - 5.3 mmol/L    Chloride 105 98 - 110 mmol/L    CO2 26.0 24.0 - 31.0 mmol/L    Calcium 9.4 8.4 - 10.4 mg/dL    Total Protein 7.7 6.3 - 8.7 g/dL    Albumin 4.10 3.50 - 5.00 g/dL    ALT (SGPT) 18 0 - 54 U/L    AST (SGOT) 36 7 - 45 U/L    Alkaline Phosphatase 70 24 - 120 U/L    Total Bilirubin 0.3 0.1 - 1.0 mg/dL    eGFR Non African Amer 123 >60 mL/min/1.73    eGFR  African Amer  >60 mL/min/1.73    Globulin 3.6 gm/dL    A/G Ratio 1.1 1.1 - 2.5 g/dL    BUN/Creatinine Ratio 36.4 (H) 7.0 - 25.0    Anion Gap 11.0 4.0 - 13.0 mmol/L   Valproic Acid Level, Total   Result Value Ref Range    Valproic Acid 85.1 50.0 - 100.0 mcg/mL   CBC Auto Differential   Result Value Ref Range    WBC 6.04 4.80 - 10.80 10*3/mm3    RBC 4.04 (L) 4.20 - 5.40 10*6/mm3    Hemoglobin 12.0 12.0 - 16.0 g/dL    Hematocrit 36.0 (L) 37.0 - 47.0 %    MCV 89.1 82.0 - 98.0 fL    MCH 29.7 28.0 - 32.0 pg    MCHC 33.3 33.0 - 36.0 g/dL    RDW 13.6 12.0 - 15.0 %    RDW-SD 44.6 40.0 - 54.0 fl    MPV 10.9 6.0 - 12.0 fL    Platelets 200 130 - 400 10*3/mm3    Neutrophil % 46.0 39.0 - 78.0 %    Lymphocyte % 38.4 15.0 - 45.0 %    Monocyte % 12.4 (H) 4.0 - 12.0 %    Eosinophil % 2.8 0.0 - 4.0 %    Basophil % 0.2 0.0 - 2.0 %    Immature Grans % 0.2 0.0 - 5.0 %    Neutrophils, Absolute 2.78 1.87 - 8.40 10*3/mm3    Lymphocytes, Absolute 2.32 0.72 - 4.86 10*3/mm3    Monocytes, Absolute 0.75 0.19 - 1.30 10*3/mm3    Eosinophils, Absolute 0.17 0.00 - 0.70 10*3/mm3    Basophils, Absolute 0.01 0.00 - 0.20 10*3/mm3    Immature Grans, Absolute 0.01 0.00 - 0.03 10*3/mm3        ASSESSMENT/PLAN    Diagnoses and all orders for this visit:    Therapeutic drug monitoring  -     CBC & Differential; Future  -     Comprehensive Metabolic Panel; Future  -     Valproic Acid  Level, Total; Future  -     Lacosamide Blood; Future    Generalized convulsive seizures    Other orders  -     Divalproex Sodium (DEPAKOTE SPRINKLE) 125 MG capsule; Take 1 capsule by mouth Every 12 (Twelve) Hours. Take 4 capsules every 12 hours    MEDICAL DECISION MAKIN. Continue with depakote sprinkles 125 mg 4 capsules BID  2. Continue with vimpat 200 mg BID and 100mg at HS  3. Obtain labs, depakote level, vimpat level, CBC, CMP  4. Seizure precautions were discussed to include no tub baths, no swimming, avoiding lack of sleep, and avoiding known triggers. Education given of things that may contribute to a seizure to include, but not limited to: stressful situations, fever, fatigue, lack of sleep, low blood sugar, hyperventilation, flashing lights, and caffeine. Instructions given to take seizure medications as prescribed. Education given to family member on what to do during a seizure and care following the seizure. Education given to contact this office prior to stopping or changing any medications.  5. If episodes of jocularity become more frequent, caregiver is to let me know.  6. Does not use tobacco  7. Healthy BMI. Patient's Body mass index is 21.61 kg/m². BMI is within normal parameters. No follow-up required.       allergies and all known medications/prescriptions have been reviewed using resources available on this encounter.    Return in about 6 months (around 2018).        SOWMYA Colby

## 2018-05-22 NOTE — PATIENT INSTRUCTIONS
Epilepsy  Epilepsy is a condition in which a person has repeated seizures over time. A seizure is a sudden burst of abnormal electrical and chemical activity in the brain. Seizures can cause a change in attention, behavior, or the ability to remain awake and alert (altered mental status).  Epilepsy increases a person's risk of falls, accidents, and injury. It can also lead to complications, including:  · Depression.  · Poor memory.  · Sudden unexplained death in epilepsy (SUDEP). This complication is rare, and its cause is not known.  Most people with epilepsy lead normal lives.  What are the causes?  This condition may be caused by:  · A head injury.  · An injury that happens at birth.  · A high fever during childhood.  · A stroke.  · Bleeding that goes into or around the brain.  · Certain medicines and drugs.  · Having too little oxygen for a long period of time.  · Abnormal brain development.  · Certain infections, such as meningitis and encephalitis.  · Brain tumors.  · Conditions that are passed along from parent to child (are hereditary).  What are the signs or symptoms?  Symptoms of a seizure vary greatly from person to person. They include:  · Convulsions.  · Stiffening of the body.  · Involuntary movements of the arms or legs.  · Loss of consciousness.  · Breathing problems.  · Falling suddenly.  · Confusion.  · Head nodding.  · Eye blinking or fluttering.  · Lip smacking.  · Drooling.  · Rapid eye movements.  · Grunting.  · Loss of bladder control and bowel control.  · Staring.  · Unresponsiveness.  Some people have symptoms right before a seizure happens (aura) and right after a seizure happens. Symptoms of an aura include:  · Fear or anxiety.  · Nausea.  · Feeling like the room is spinning (vertigo).  · A feeling of having seen or heard something before (franko vu).  · Odd tastes or smells.  · Changes in vision, such as seeing flashing lights or spots.  Symptoms that follow a seizure  include:  · Confusion.  · Sleepiness.  · Headache.  How is this diagnosed?  This condition is diagnosed based on:  · Your symptoms.  · Your medical history.  · A physical exam.  · A neurological exam. A neurological exam is similar to a physical exam. It involves checking your strength, reflexes, coordination, and sensations.  · Tests, such as:  ¨ An electroencephalogram (EEG). This is a painless test that creates a diagram of your brain waves.  ¨ An MRI of the brain.  ¨ A CT scan of the brain.  ¨ A lumbar puncture, also called a spinal tap.  ¨ Blood tests to check for signs of infection or abnormal blood chemistry.  How is this treated?  There is no cure for this condition, but treatment can help control seizures. Treatment may involve:  · Taking medicines to control seizures. These include medicines to prevent seizures and medicines to stop seizures as they occur.  · Having a device called a vagus nerve stimulator implanted in the chest. The device sends electrical impulses to the vagus nerve and to the brain to prevent seizures. This treatment may be recommended if medicines do not help.  · Brain surgery. There are several kinds of surgeries that may be done to stop seizures from happening or to reduce how often seizures happen.  · Having regular blood tests. You may need to have blood tests regularly to check that you are getting the right amount of medicine.  Once this condition has been diagnosed, it is important to begin treatment as soon as possible. For some people, epilepsy eventually goes away.  Follow these instructions at home:  Medicines     · Take over-the-counter and prescription medicines only as told by your health care provider.  · Avoid any substances that may prevent your medicine from working properly, such as alcohol.  Activity   · Get enough rest. Lack of sleep can make seizures more likely to occur.  · Follow instructions from your health care provider about driving, swimming, and doing any  other activities that would be dangerous if you had a seizure.  Educating others   Teach friends and family what to do if you have a seizure. They should:  · Lay you on the ground to prevent a fall.  · Cushion your head and body.  · Loosen any tight clothing around your neck.  · Turn you on your side. If vomiting occurs, this helps keep your airway clear.  · Stay with you until you recover.  · Not hold you down. Holding you down will not stop the seizure.  · Not put anything in your mouth.  · Know whether or not you need emergency care.  General instructions   · Avoid anything that has ever triggered a seizure for you.  · Keep a seizure diary. Record what you remember about each seizure, especially anything that might have triggered the seizure.  · Keep all follow-up visits as told by your health care provider. This is important.  Contact a health care provider if:  · Your seizure pattern changes.  · You have symptoms of infection or another illness. This might increase your risk of having a seizure.  Get help right away if:  · You have a seizure that does not stop after 5 minutes.  · You have several seizures in a row without a complete recovery in between seizures.  · You have a seizure that makes it harder to breathe.  · You have a seizure that is different from previous seizures.  · You have a seizure that leaves you unable to speak or use a part of your body.  · You did not wake up immediately after a seizure.  This information is not intended to replace advice given to you by your health care provider. Make sure you discuss any questions you have with your health care provider.  Document Released: 12/18/2006 Document Revised: 07/15/2017 Document Reviewed: 06/27/2017  [employer] Interactive Patient Education © 2017 [employer]

## 2018-05-24 RX ORDER — DIVALPROEX SODIUM 125 MG/1
CAPSULE, COATED PELLETS ORAL
Qty: 240 CAPSULE | Refills: 5 | Status: SHIPPED | OUTPATIENT
Start: 2018-05-24 | End: 2018-06-19 | Stop reason: SDUPTHER

## 2018-06-19 RX ORDER — DIVALPROEX SODIUM 125 MG/1
CAPSULE, COATED PELLETS ORAL
Qty: 240 CAPSULE | Refills: 5 | Status: SHIPPED | OUTPATIENT
Start: 2018-06-19 | End: 2019-04-04 | Stop reason: SDUPTHER

## 2018-07-10 RX ORDER — LACOSAMIDE 200 MG/1
200 TABLET ORAL EVERY 12 HOURS SCHEDULED
Qty: 60 TABLET | Refills: 2 | OUTPATIENT
Start: 2018-07-10 | End: 2018-10-08 | Stop reason: SDUPTHER

## 2018-07-10 RX ORDER — LACOSAMIDE 100 MG/1
100 TABLET ORAL
Qty: 30 TABLET | Refills: 2 | OUTPATIENT
Start: 2018-07-10 | End: 2018-10-08 | Stop reason: SDUPTHER

## 2018-08-21 ENCOUNTER — OFFICE VISIT (OUTPATIENT)
Dept: FAMILY MEDICINE CLINIC | Facility: CLINIC | Age: 43
End: 2018-08-21

## 2018-08-21 VITALS
DIASTOLIC BLOOD PRESSURE: 82 MMHG | SYSTOLIC BLOOD PRESSURE: 116 MMHG | TEMPERATURE: 98.3 F | HEIGHT: 63 IN | OXYGEN SATURATION: 98 % | BODY MASS INDEX: 18.85 KG/M2 | HEART RATE: 82 BPM | RESPIRATION RATE: 18 BRPM | WEIGHT: 106.4 LBS

## 2018-08-21 DIAGNOSIS — F81.89 DEVELOPMENTAL NON-VERBAL DISORDER: ICD-10-CM

## 2018-08-21 DIAGNOSIS — Z86.19 HX OF TOXOPLASMOSIS: ICD-10-CM

## 2018-08-21 DIAGNOSIS — Z62.21 FOSTER CARE (STATUS): ICD-10-CM

## 2018-08-21 DIAGNOSIS — R56.9 GENERALIZED CONVULSIVE SEIZURES (HCC): ICD-10-CM

## 2018-08-21 DIAGNOSIS — E03.9 ACQUIRED HYPOTHYROIDISM: ICD-10-CM

## 2018-08-21 DIAGNOSIS — Z00.00 ADULT GENERAL MEDICAL EXAM: Primary | ICD-10-CM

## 2018-08-21 PROCEDURE — 99396 PREV VISIT EST AGE 40-64: CPT | Performed by: FAMILY MEDICINE

## 2018-09-04 PROBLEM — F81.89 DEVELOPMENTAL NON-VERBAL DISORDER: Status: ACTIVE | Noted: 2018-09-04

## 2018-09-14 RX ORDER — FOLIC ACID/MV,IRON,MIN/LUTEIN 0.4-18-25
1 TABLET ORAL DAILY
Qty: 90 TABLET | Refills: 3 | Status: SHIPPED | OUTPATIENT
Start: 2018-09-14 | End: 2018-12-06

## 2018-10-08 RX ORDER — LACOSAMIDE 200 MG/1
TABLET, FILM COATED ORAL
Qty: 60 TABLET | Refills: 2 | OUTPATIENT
Start: 2018-10-08 | End: 2019-01-07 | Stop reason: SDUPTHER

## 2018-10-08 RX ORDER — LACOSAMIDE 100 MG/1
100 TABLET ORAL
Qty: 30 TABLET | Refills: 2 | OUTPATIENT
Start: 2018-10-08 | End: 2019-02-08 | Stop reason: SDUPTHER

## 2018-11-07 RX ORDER — CETIRIZINE HYDROCHLORIDE 10 MG/1
10 TABLET ORAL DAILY
Qty: 90 TABLET | Refills: 2 | Status: SHIPPED | OUTPATIENT
Start: 2018-11-07 | End: 2019-08-12 | Stop reason: SDUPTHER

## 2018-11-27 ENCOUNTER — OFFICE VISIT (OUTPATIENT)
Dept: NEUROLOGY | Facility: CLINIC | Age: 43
End: 2018-11-27

## 2018-11-27 VITALS
BODY MASS INDEX: 21.62 KG/M2 | WEIGHT: 122 LBS | HEIGHT: 63 IN | DIASTOLIC BLOOD PRESSURE: 60 MMHG | HEART RATE: 78 BPM | SYSTOLIC BLOOD PRESSURE: 120 MMHG

## 2018-11-27 DIAGNOSIS — R56.9 GENERALIZED CONVULSIVE SEIZURES (HCC): Primary | ICD-10-CM

## 2018-11-27 DIAGNOSIS — F81.89 DEVELOPMENTAL NON-VERBAL DISORDER: ICD-10-CM

## 2018-11-27 PROCEDURE — 99213 OFFICE O/P EST LOW 20 MIN: CPT | Performed by: CLINICAL NURSE SPECIALIST

## 2018-11-27 NOTE — PATIENT INSTRUCTIONS
Epilepsy  Epilepsy is a condition in which a person has repeated seizures over time. A seizure is a sudden burst of abnormal electrical and chemical activity in the brain. Seizures can cause a change in attention, behavior, or the ability to remain awake and alert (altered mental status).  Epilepsy increases a person's risk of falls, accidents, and injury. It can also lead to complications, including:  · Depression.  · Poor memory.  · Sudden unexplained death in epilepsy (SUDEP). This complication is rare, and its cause is not known.    Most people with epilepsy lead normal lives.  What are the causes?  This condition may be caused by:  · A head injury.  · An injury that happens at birth.  · A high fever during childhood.  · A stroke.  · Bleeding that goes into or around the brain.  · Certain medicines and drugs.  · Having too little oxygen for a long period of time.  · Abnormal brain development.  · Certain infections, such as meningitis and encephalitis.  · Brain tumors.  · Conditions that are passed along from parent to child (are hereditary).    What are the signs or symptoms?  Symptoms of a seizure vary greatly from person to person. They include:  · Convulsions.  · Stiffening of the body.  · Involuntary movements of the arms or legs.  · Loss of consciousness.  · Breathing problems.  · Falling suddenly.  · Confusion.  · Head nodding.  · Eye blinking or fluttering.  · Lip smacking.  · Drooling.  · Rapid eye movements.  · Grunting.  · Loss of bladder control and bowel control.  · Staring.  · Unresponsiveness.    Some people have symptoms right before a seizure happens (aura) and right after a seizure happens. Symptoms of an aura include:  · Fear or anxiety.  · Nausea.  · Feeling like the room is spinning (vertigo).  · A feeling of having seen or heard something before (franko vu).  · Odd tastes or smells.  · Changes in vision, such as seeing flashing lights or spots.    Symptoms that follow a seizure  include:  · Confusion.  · Sleepiness.  · Headache.    How is this diagnosed?  This condition is diagnosed based on:  · Your symptoms.  · Your medical history.  · A physical exam.  · A neurological exam. A neurological exam is similar to a physical exam. It involves checking your strength, reflexes, coordination, and sensations.  · Tests, such as:  ? An electroencephalogram (EEG). This is a painless test that creates a diagram of your brain waves.  ? An MRI of the brain.  ? A CT scan of the brain.  ? A lumbar puncture, also called a spinal tap.  ? Blood tests to check for signs of infection or abnormal blood chemistry.    How is this treated?  There is no cure for this condition, but treatment can help control seizures. Treatment may involve:  · Taking medicines to control seizures. These include medicines to prevent seizures and medicines to stop seizures as they occur.  · Having a device called a vagus nerve stimulator implanted in the chest. The device sends electrical impulses to the vagus nerve and to the brain to prevent seizures. This treatment may be recommended if medicines do not help.  · Brain surgery. There are several kinds of surgeries that may be done to stop seizures from happening or to reduce how often seizures happen.  · Having regular blood tests. You may need to have blood tests regularly to check that you are getting the right amount of medicine.    Once this condition has been diagnosed, it is important to begin treatment as soon as possible. For some people, epilepsy eventually goes away.  Follow these instructions at home:  Medicines    · Take over-the-counter and prescription medicines only as told by your health care provider.  · Avoid any substances that may prevent your medicine from working properly, such as alcohol.  Activity  · Get enough rest. Lack of sleep can make seizures more likely to occur.  · Follow instructions from your health care provider about driving, swimming, and doing  any other activities that would be dangerous if you had a seizure.  Educating others  Teach friends and family what to do if you have a seizure. They should:  · Lay you on the ground to prevent a fall.  · Cushion your head and body.  · Loosen any tight clothing around your neck.  · Turn you on your side. If vomiting occurs, this helps keep your airway clear.  · Stay with you until you recover.  · Not hold you down. Holding you down will not stop the seizure.  · Not put anything in your mouth.  · Know whether or not you need emergency care.    General instructions  · Avoid anything that has ever triggered a seizure for you.  · Keep a seizure diary. Record what you remember about each seizure, especially anything that might have triggered the seizure.  · Keep all follow-up visits as told by your health care provider. This is important.  Contact a health care provider if:  · Your seizure pattern changes.  · You have symptoms of infection or another illness. This might increase your risk of having a seizure.  Get help right away if:  · You have a seizure that does not stop after 5 minutes.  · You have several seizures in a row without a complete recovery in between seizures.  · You have a seizure that makes it harder to breathe.  · You have a seizure that is different from previous seizures.  · You have a seizure that leaves you unable to speak or use a part of your body.  · You did not wake up immediately after a seizure.  This information is not intended to replace advice given to you by your health care provider. Make sure you discuss any questions you have with your health care provider.  Document Released: 12/18/2006 Document Revised: 07/15/2017 Document Reviewed: 06/27/2017  Elsevier Interactive Patient Education © 2018 [employer]

## 2018-11-27 NOTE — PROGRESS NOTES
Subjective     Chief Complaint   Patient presents with   • Seizures       Nely Abbasi is a 43  y.o. female right handed on disability and hx of developmental nonverbal disorder.  She is here today for follow up for seizures. She was last seen 5/2018.  She is accompanied by her caregiver. She has history of living in an institute from age 9 for 30 years.  As far as the caregiver is aware the patient has not had a seizure since 2015.  She has no new complaints today.  patient was to have labs done after last visit but has not been done.        According to the caregiver, the patient was institutionalized at a facility in [place], [place] at age 9 for about 30 years and then sent to a group home.      Seizures    This is a chronic problem. Episode onset: last reported seizure 2015. Associated symptoms include sleepiness, confusion and visual disturbances (dysconjugate gaze). Pertinent negatives include no headaches, no cough, no nausea, no vomiting and no diarrhea. Characteristics include rhythmic jerking and loss of consciousness.        Current Outpatient Medications   Medication Sig Dispense Refill   • Calcium Carb-Cholecalciferol (CALCIUM 600+D3) 600-200 MG-UNIT tablet Take 1 tablet by mouth Daily. 90 each 3   • cetirizine (zyrTEC) 10 MG tablet Take 1 tablet by mouth Daily. 90 tablet 2   • [employer] SUNSCREEN SPF 30 lotion Apply sunscreen every 2 hours when in the sun. 237 mL 0   • Divalproex Sodium (DEPAKOTE SPRINKLE) 125 MG capsule Take 4 capsules every 12 hours 240 capsule 5   • folic acid (FOLVITE) 1 MG tablet TAKE ONE TABLET BY MOUTH EVERY DAY 30 tablet 11   • Incontinence Supply Disposable (DEPEND UNDERWEAR SM/MED) misc 6 briefs per day 32 each 12   • Infant Care Products (BABY WIPES) misc 1 pack per week. 80 each 12   • lacosamide (VIMPAT) 100 MG tablet tablet Take 1 tablet by mouth every night at bedtime. 30 tablet 2   • levothyroxine (SYNTHROID, LEVOTHROID) 25 MCG tablet Take 1 tablet by mouth Daily. 90 tablet 2    • Multiple Vitamins-Minerals (CENTRUM ADULTS) tablet Take 1 each by mouth Daily. 90 tablet 2   • Multiple Vitamins-Minerals (CERTAVITE/ANTIOXIDANTS) tablet Take 1 tablet by mouth Daily. 90 tablet 3   • polyethylene glycol (MIRALAX) packet Take 17 g by mouth Daily. Patient is to take for constipation if no bowel within 3 days patient is to take daily until results. 100 each 1   • VIMPAT 200 MG tablet TAKE ONE TABLET BY MOUTH EVERY 12 HOURS 60 tablet 2     No current facility-administered medications for this visit.        Past Medical History:   Diagnosis Date   • Blind    • Developmental delay    • Seizure (CMS/HCC)    • Seizures (CMS/HCC)    • Toxoplasmosis    • Toxoplasmosis        No past surgical history on file.    family history includes No Known Problems in her father, maternal grandfather, maternal grandmother, mother, paternal grandfather, paternal grandmother, sister, and sister.    Social History     Tobacco Use   • Smoking status: Never Smoker   • Smokeless tobacco: Never Used   Substance Use Topics   • Alcohol use: No   • Drug use: No       Review of Systems   Constitutional: Negative for fatigue and fever.   HENT: Negative for rhinorrhea and trouble swallowing.    Eyes: Positive for visual disturbance (dysconjugate gaze).   Respiratory: Negative for cough and choking.    Cardiovascular: Negative for leg swelling.   Gastrointestinal: Positive for constipation. Negative for diarrhea, nausea and vomiting.   Endocrine: Negative for cold intolerance and heat intolerance.   Genitourinary: Negative for dysuria and frequency.        Hx of incontinence. Wears depends   Musculoskeletal: Positive for gait problem. Negative for arthralgias and myalgias.   Neurological: Positive for seizures and loss of consciousness. Negative for dizziness, weakness and headaches.   Hematological: Negative for adenopathy.   Psychiatric/Behavioral: Positive for confusion. Negative for agitation and hallucinations.       Objective  "    /60   Pulse 78   Ht 160 cm (63\")   Wt 55.3 kg (122 lb)   BMI 21.61 kg/m² , Body mass index is 21.61 kg/m².    Physical Exam   Constitutional: Vital signs are normal. She appears well-developed and well-nourished. She is cooperative.   HENT:   Head: Normocephalic.   Right Ear: Hearing and external ear normal.   Left Ear: Hearing and external ear normal.   Nose: Nose normal.   Mouth/Throat: Oropharynx is clear and moist.   Eyes: Conjunctivae are normal. Pupils are equal, round, and reactive to light. Right eye exhibits normal extraocular motion and no nystagmus. Left eye exhibits normal extraocular motion and no nystagmus. Right pupil is round and reactive. Left pupil is round and reactive. Pupils are equal.   Dysconjugate gaze   Neck: Normal range of motion. Neck supple. No JVD present. No spinous process tenderness present. Normal range of motion present.   Cardiovascular: Normal rate, regular rhythm and normal heart sounds. Exam reveals no gallop and no friction rub.   No murmur heard.  Pulmonary/Chest: Effort normal and breath sounds normal. She has no decreased breath sounds. She has no wheezes. She has no rhonchi. She has no rales.   Abdominal: Soft. Bowel sounds are normal. There is no tenderness.   Musculoskeletal: She exhibits no edema.   Lymphadenopathy:     She has no cervical adenopathy.   Neurological: She is alert. She is disoriented (non verbal, not able to follow commands). She displays no atrophy and no tremor. No cranial nerve deficit. She exhibits normal muscle tone. Gait (wide based gait) abnormal. Coordination normal.   Reflex Scores:       Tricep reflexes are 1+ on the right side and 1+ on the left side.       Bicep reflexes are 1+ on the right side and 1+ on the left side.       Brachioradialis reflexes are 1+ on the right side and 1+ on the left side.       Patellar reflexes are 1+ on the right side and 1+ on the left side.  Moves upper and lower extremities, gait unsteady at " times. Wide based gait. Caregiver states she can feed herself some food types  Follow simple commands   Skin: Skin is warm and dry.   Psychiatric:   Sitting in wheelchair, moving a strap constantly,             ASSESSMENT/PLAN    Diagnoses and all orders for this visit:    Generalized convulsive seizures (CMS/HCC)    Developmental non-verbal disorder    MEDICAL DECISION MAKIN. Continue with depakote sprinkles 125 mg 4 capsules BID  2. Continue with vimpat 200 mg BID and 100mg at HS  3. Obtain labs, depakote level, vimpat level, CBC, CMP as ordered 2108  4. Seizure precautions were discussed to include no tub baths, no swimming, avoiding lack of sleep, and avoiding known triggers. Education given of things that may contribute to a seizure to include, but not limited to: stressful situations, fever, fatigue, lack of sleep, low blood sugar, hyperventilation, flashing lights, and caffeine. Instructions given to take seizure medications as prescribed. Education given to family member on what to do during a seizure and care following the seizure. Education given to contact this office prior to stopping or changing any medications.  5. Episodes of jocularity resolved.  6. Does not use tobacco  7. Patient's Body mass index is 21.61 kg/m². BMI is within normal parameters. No follow-up required.       HPI and ROS reviewed and updated.      allergies and all known medications/prescriptions have been reviewed using resources available on this encounter.    Return in about 6 months (around 2019).        SOWMYA Colby

## 2018-12-03 DIAGNOSIS — K59.00 CONSTIPATION, UNSPECIFIED CONSTIPATION TYPE: ICD-10-CM

## 2018-12-04 RX ORDER — POLYETHYLENE GLYCOL 3350 17 G/17G
17 POWDER, FOR SOLUTION ORAL DAILY
Qty: 100 EACH | Refills: 1 | Status: SHIPPED | OUTPATIENT
Start: 2018-12-04 | End: 2019-04-24

## 2018-12-06 ENCOUNTER — OFFICE VISIT (OUTPATIENT)
Dept: FAMILY MEDICINE CLINIC | Facility: CLINIC | Age: 43
End: 2018-12-06

## 2018-12-06 VITALS
WEIGHT: 103.6 LBS | HEART RATE: 86 BPM | TEMPERATURE: 98.3 F | SYSTOLIC BLOOD PRESSURE: 114 MMHG | DIASTOLIC BLOOD PRESSURE: 62 MMHG | HEIGHT: 63 IN | BODY MASS INDEX: 18.36 KG/M2 | RESPIRATION RATE: 18 BRPM

## 2018-12-06 DIAGNOSIS — Z79.899 MEDICATION MANAGEMENT: ICD-10-CM

## 2018-12-06 DIAGNOSIS — K59.04 CHRONIC IDIOPATHIC CONSTIPATION: ICD-10-CM

## 2018-12-06 DIAGNOSIS — Z86.19 HX OF TOXOPLASMOSIS: ICD-10-CM

## 2018-12-06 DIAGNOSIS — E03.9 ACQUIRED HYPOTHYROIDISM: Primary | ICD-10-CM

## 2018-12-06 DIAGNOSIS — R56.9 GENERALIZED CONVULSIVE SEIZURES (HCC): ICD-10-CM

## 2018-12-06 DIAGNOSIS — F81.89 DEVELOPMENTAL NON-VERBAL DISORDER: ICD-10-CM

## 2018-12-06 PROCEDURE — 99214 OFFICE O/P EST MOD 30 MIN: CPT | Performed by: FAMILY MEDICINE
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
## 2018-12-14 LAB
ALBUMIN SERPL-MCNC: 4.7 G/DL (ref 3.5–5.5)
ALBUMIN/GLOB SERPL: 1.7 {RATIO} (ref 1.2–2.2)
ALP SERPL-CCNC: 56 IU/L (ref 39–117)
ALT SERPL-CCNC: 13 IU/L (ref 0–32)
AST SERPL-CCNC: 17 IU/L (ref 0–40)
BASOPHILS # BLD AUTO: 0 X10E3/UL (ref 0–0.2)
BASOPHILS NFR BLD AUTO: 0 %
BILIRUB SERPL-MCNC: <0.2 MG/DL (ref 0–1.2)
BUN SERPL-MCNC: 21 MG/DL (ref 6–24)
BUN/CREAT SERPL: 37 (ref 9–23)
CALCIUM SERPL-MCNC: 10.4 MG/DL (ref 8.7–10.2)
CHLORIDE SERPL-SCNC: 101 MMOL/L (ref 96–106)
CO2 SERPL-SCNC: 23 MMOL/L (ref 20–29)
CREAT SERPL-MCNC: 0.57 MG/DL (ref 0.57–1)
EOSINOPHIL # BLD AUTO: 0.1 X10E3/UL (ref 0–0.4)
EOSINOPHIL NFR BLD AUTO: 1 %
ERYTHROCYTE [DISTWIDTH] IN BLOOD BY AUTOMATED COUNT: 13.5 % (ref 12.3–15.4)
GLOBULIN SER CALC-MCNC: 2.8 G/DL (ref 1.5–4.5)
GLUCOSE SERPL-MCNC: 83 MG/DL (ref 65–99)
HCT VFR BLD AUTO: 41.7 % (ref 34–46.6)
HGB BLD-MCNC: 14.2 G/DL (ref 11.1–15.9)
IMM GRANULOCYTES # BLD: 0 X10E3/UL (ref 0–0.1)
IMM GRANULOCYTES NFR BLD: 0 %
LACOSAMIDE SERPL-MCNC: 16.6 UG/ML (ref 5–10)
LYMPHOCYTES # BLD AUTO: 3.3 X10E3/UL (ref 0.7–3.1)
LYMPHOCYTES NFR BLD AUTO: 44 %
MCH RBC QN AUTO: 30.7 PG (ref 26.6–33)
MCHC RBC AUTO-ENTMCNC: 34.1 G/DL (ref 31.5–35.7)
MCV RBC AUTO: 90 FL (ref 79–97)
MONOCYTES # BLD AUTO: 0.7 X10E3/UL (ref 0.1–0.9)
MONOCYTES NFR BLD AUTO: 9 %
NEUTROPHILS # BLD AUTO: 3.4 X10E3/UL (ref 1.4–7)
NEUTROPHILS NFR BLD AUTO: 46 %
PLATELET # BLD AUTO: 230 X10E3/UL (ref 150–379)
POTASSIUM SERPL-SCNC: 3.9 MMOL/L (ref 3.5–5.2)
PROT SERPL-MCNC: 7.5 G/DL (ref 6–8.5)
RBC # BLD AUTO: 4.62 X10E6/UL (ref 3.77–5.28)
SODIUM SERPL-SCNC: 142 MMOL/L (ref 134–144)
T4 FREE SERPL-MCNC: 1.37 NG/DL (ref 0.82–1.77)
TSH SERPL DL<=0.005 MIU/L-ACNC: 0.84 UIU/ML (ref 0.45–4.5)
VALPROATE FREE SERPL-MCNC: 38.2 UG/ML (ref 6–22)
VALPROATE SERPL-MCNC: 116 UG/ML (ref 50–100)
WBC # BLD AUTO: 7.6 X10E3/UL (ref 3.4–10.8)

## 2018-12-20 DIAGNOSIS — E03.9 ACQUIRED HYPOTHYROIDISM: ICD-10-CM

## 2018-12-20 RX ORDER — LEVOTHYROXINE SODIUM 0.03 MG/1
25 TABLET ORAL DAILY
Qty: 90 TABLET | Refills: 3 | Status: SHIPPED | OUTPATIENT
Start: 2018-12-20 | End: 2019-12-23

## 2019-01-07 RX ORDER — LACOSAMIDE 200 MG/1
TABLET, FILM COATED ORAL
Qty: 60 TABLET | Refills: 2 | OUTPATIENT
Start: 2019-01-07 | End: 2019-04-04 | Stop reason: SDUPTHER

## 2019-01-22 DIAGNOSIS — G40.909 SEIZURE DISORDER (HCC): ICD-10-CM

## 2019-01-22 RX ORDER — FOLIC ACID 1 MG/1
TABLET ORAL
Qty: 30 TABLET | Refills: 11 | Status: SHIPPED | OUTPATIENT
Start: 2019-01-22 | End: 2020-01-27

## 2019-02-08 RX ORDER — LACOSAMIDE 100 MG/1
TABLET, FILM COATED ORAL
Qty: 30 TABLET | Refills: 2 | OUTPATIENT
Start: 2019-02-08 | End: 2019-05-16 | Stop reason: SDUPTHER

## 2019-02-11 RX ORDER — LACOSAMIDE 100 MG/1
TABLET, FILM COATED ORAL
Qty: 30 TABLET | Refills: 2 | OUTPATIENT
Start: 2019-02-11

## 2019-04-04 RX ORDER — LACOSAMIDE 200 MG/1
TABLET, FILM COATED ORAL
Qty: 60 TABLET | Refills: 2 | OUTPATIENT
Start: 2019-04-04 | End: 2019-07-11 | Stop reason: SDUPTHER

## 2019-04-04 RX ORDER — DIVALPROEX SODIUM 125 MG/1
CAPSULE, COATED PELLETS ORAL
Qty: 240 CAPSULE | Refills: 5 | Status: SHIPPED | OUTPATIENT
Start: 2019-04-04 | End: 2019-10-22 | Stop reason: SDUPTHER

## 2019-04-05 RX ORDER — LACOSAMIDE 200 MG/1
TABLET, FILM COATED ORAL
Qty: 60 TABLET | Refills: 2 | OUTPATIENT
Start: 2019-04-05

## 2019-05-16 RX ORDER — LACOSAMIDE 100 MG/1
TABLET, FILM COATED ORAL
Qty: 30 TABLET | Refills: 2 | OUTPATIENT
Start: 2019-05-16 | End: 2019-08-15 | Stop reason: SDUPTHER

## 2019-07-11 RX ORDER — LACOSAMIDE 200 MG/1
TABLET, FILM COATED ORAL
Qty: 60 TABLET | Refills: 2 | OUTPATIENT
Start: 2019-07-11 | End: 2019-08-15 | Stop reason: SDUPTHER

## 2019-08-12 RX ORDER — CETIRIZINE HYDROCHLORIDE 10 MG/1
10 TABLET ORAL DAILY
Qty: 90 TABLET | Refills: 2 | Status: SHIPPED | OUTPATIENT
Start: 2019-08-12 | End: 2020-06-11 | Stop reason: SDUPTHER

## 2019-08-15 DIAGNOSIS — G40.909 SEIZURE DISORDER (HCC): ICD-10-CM

## 2019-08-15 RX ORDER — LACOSAMIDE 200 MG/1
1 TABLET ORAL EVERY 12 HOURS
Qty: 60 TABLET | Refills: 2 | OUTPATIENT
Start: 2019-08-15 | End: 2019-10-22 | Stop reason: SDUPTHER

## 2019-08-15 RX ORDER — LACOSAMIDE 100 MG/1
TABLET, FILM COATED ORAL
Qty: 30 TABLET | Refills: 2 | OUTPATIENT
Start: 2019-08-15 | End: 2019-10-22 | Stop reason: SDUPTHER
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
## 2019-10-22 RX ORDER — LACOSAMIDE 200 MG/1
TABLET, FILM COATED ORAL
Qty: 60 TABLET | Refills: 0 | OUTPATIENT
Start: 2019-10-22 | End: 2019-12-17 | Stop reason: SDUPTHER

## 2019-10-22 RX ORDER — LACOSAMIDE 100 MG/1
TABLET, FILM COATED ORAL
Qty: 30 TABLET | Refills: 0 | OUTPATIENT
Start: 2019-10-22 | End: 2019-12-17 | Stop reason: SDUPTHER

## 2019-10-22 RX ORDER — DIVALPROEX SODIUM 125 MG/1
CAPSULE, COATED PELLETS ORAL
Qty: 240 CAPSULE | Refills: 0 | Status: SHIPPED | OUTPATIENT
Start: 2019-10-22 | End: 2019-12-05 | Stop reason: SDUPTHER

## 2019-11-05 DIAGNOSIS — E03.9 ACQUIRED HYPOTHYROIDISM: ICD-10-CM

## 2019-11-05 RX ORDER — LEVOTHYROXINE SODIUM 0.03 MG/1
25 TABLET ORAL DAILY
Qty: 90 TABLET | Refills: 3 | OUTPATIENT
Start: 2019-11-05

## 2019-12-05 RX ORDER — DIVALPROEX SODIUM 125 MG/1
CAPSULE, COATED PELLETS ORAL
Qty: 240 CAPSULE | Refills: 0 | Status: SHIPPED | OUTPATIENT
Start: 2019-12-05 | End: 2020-01-27

## 2019-12-17 DIAGNOSIS — G40.909 SEIZURE DISORDER (HCC): Primary | ICD-10-CM

## 2019-12-17 RX ORDER — LACOSAMIDE 100 MG/1
TABLET, FILM COATED ORAL
Qty: 30 TABLET | Refills: 0 | OUTPATIENT
Start: 2019-12-17 | End: 2020-01-27

## 2019-12-17 RX ORDER — LACOSAMIDE 200 MG/1
TABLET, FILM COATED ORAL
Qty: 60 TABLET | Refills: 0 | OUTPATIENT
Start: 2019-12-17 | End: 2020-01-27

## 2019-12-17 NOTE — TELEPHONE ENCOUNTER
Elsa said they were out of town and all of them are exhausted,so they cancelled Nely's appointment today.  She isn't having any problems.  I explained that she needs to keep her next appointment, it has been over a year since her last appointment.  Elsa said Nely will be here for the next appointment.

## 2019-12-23 DIAGNOSIS — E03.9 ACQUIRED HYPOTHYROIDISM: ICD-10-CM

## 2019-12-23 RX ORDER — LEVOTHYROXINE SODIUM 0.03 MG/1
25 TABLET ORAL DAILY
Qty: 30 TABLET | Refills: 0 | Status: SHIPPED | OUTPATIENT
Start: 2019-12-23 | End: 2020-01-28 | Stop reason: SDUPTHER

## 2020-01-06 ENCOUNTER — OFFICE VISIT (OUTPATIENT)
Dept: FAMILY MEDICINE CLINIC | Facility: CLINIC | Age: 45
End: 2020-01-06

## 2020-01-06 VITALS
RESPIRATION RATE: 16 BRPM | WEIGHT: 88.8 LBS | HEART RATE: 75 BPM | BODY MASS INDEX: 15.73 KG/M2 | DIASTOLIC BLOOD PRESSURE: 70 MMHG | OXYGEN SATURATION: 93 % | TEMPERATURE: 99.2 F | SYSTOLIC BLOOD PRESSURE: 112 MMHG

## 2020-01-06 DIAGNOSIS — E03.9 ACQUIRED HYPOTHYROIDISM: ICD-10-CM

## 2020-01-06 DIAGNOSIS — Z13.1 DIABETES MELLITUS SCREENING: ICD-10-CM

## 2020-01-06 DIAGNOSIS — Z00.01 ENCOUNTER FOR WELL ADULT EXAM WITH ABNORMAL FINDINGS: Primary | ICD-10-CM

## 2020-01-06 DIAGNOSIS — R56.9 GENERALIZED CONVULSIVE SEIZURES (HCC): ICD-10-CM

## 2020-01-06 DIAGNOSIS — Z13.220 LIPID SCREENING: ICD-10-CM

## 2020-01-06 DIAGNOSIS — K59.04 CHRONIC IDIOPATHIC CONSTIPATION: ICD-10-CM

## 2020-01-06 DIAGNOSIS — Z13.0 SCREENING FOR DEFICIENCY ANEMIA: ICD-10-CM

## 2020-01-06 DIAGNOSIS — F81.89 DEVELOPMENTAL NON-VERBAL DISORDER: ICD-10-CM

## 2020-01-06 PROCEDURE — 99396 PREV VISIT EST AGE 40-64: CPT | Performed by: FAMILY MEDICINE
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
## 2020-01-07 LAB
ALBUMIN SERPL-MCNC: 4.2 G/DL (ref 3.5–5.5)
ALBUMIN/GLOB SERPL: 1.5 {RATIO} (ref 1.2–2.2)
ALP SERPL-CCNC: 64 IU/L (ref 39–117)
ALT SERPL-CCNC: 8 IU/L (ref 0–32)
AST SERPL-CCNC: 20 IU/L (ref 0–40)
BASOPHILS # BLD AUTO: 0 X10E3/UL (ref 0–0.2)
BASOPHILS NFR BLD AUTO: 0 %
BILIRUB SERPL-MCNC: <0.2 MG/DL (ref 0–1.2)
BUN SERPL-MCNC: 18 MG/DL (ref 6–24)
BUN/CREAT SERPL: 21 (ref 9–23)
CALCIUM SERPL-MCNC: 9 MG/DL (ref 8.7–10.2)
CHLORIDE SERPL-SCNC: 98 MMOL/L (ref 96–106)
CHOLEST SERPL-MCNC: 184 MG/DL (ref 100–199)
CO2 SERPL-SCNC: 23 MMOL/L (ref 20–29)
CREAT SERPL-MCNC: 0.85 MG/DL (ref 0.57–1)
EOSINOPHIL # BLD AUTO: 0 X10E3/UL (ref 0–0.4)
EOSINOPHIL NFR BLD AUTO: 0 %
ERYTHROCYTE [DISTWIDTH] IN BLOOD BY AUTOMATED COUNT: 14.6 % (ref 11.7–15.4)
GLOBULIN SER CALC-MCNC: 2.8 G/DL (ref 1.5–4.5)
GLUCOSE SERPL-MCNC: 83 MG/DL (ref 65–99)
HCT VFR BLD AUTO: 36.1 % (ref 34–46.6)
HDLC SERPL-MCNC: 71 MG/DL
HGB BLD-MCNC: 12.1 G/DL (ref 11.1–15.9)
IMM GRANULOCYTES # BLD AUTO: 0 X10E3/UL (ref 0–0.1)
IMM GRANULOCYTES NFR BLD AUTO: 0 %
LDLC SERPL CALC-MCNC: 93 MG/DL (ref 0–99)
LYMPHOCYTES # BLD AUTO: 1.6 X10E3/UL (ref 0.7–3.1)
LYMPHOCYTES NFR BLD AUTO: 33 %
MCH RBC QN AUTO: 29.4 PG (ref 26.6–33)
MCHC RBC AUTO-ENTMCNC: 33.5 G/DL (ref 31.5–35.7)
MCV RBC AUTO: 88 FL (ref 79–97)
MONOCYTES # BLD AUTO: 1.1 X10E3/UL (ref 0.1–0.9)
MONOCYTES NFR BLD AUTO: 23 %
MORPHOLOGY BLD-IMP: ABNORMAL
NEUTROPHILS # BLD AUTO: 2.1 X10E3/UL (ref 1.4–7)
NEUTROPHILS NFR BLD AUTO: 44 %
PLATELET # BLD AUTO: 165 X10E3/UL (ref 150–450)
POTASSIUM SERPL-SCNC: 3.7 MMOL/L (ref 3.5–5.2)
PROT SERPL-MCNC: 7 G/DL (ref 6–8.5)
RBC # BLD AUTO: 4.12 X10E6/UL (ref 3.77–5.28)
SODIUM SERPL-SCNC: 135 MMOL/L (ref 134–144)
T4 FREE SERPL-MCNC: 0.82 NG/DL (ref 0.82–1.77)
TRIGL SERPL-MCNC: 100 MG/DL (ref 0–149)
TSH SERPL DL<=0.005 MIU/L-ACNC: 1.3 UIU/ML (ref 0.45–4.5)
VLDLC SERPL CALC-MCNC: 20 MG/DL (ref 5–40)
WBC # BLD AUTO: 4.9 X10E3/UL (ref 3.4–10.8)

## 2020-01-27 DIAGNOSIS — G40.909 SEIZURE DISORDER (HCC): ICD-10-CM

## 2020-01-27 RX ORDER — FOLIC ACID 1 MG/1
TABLET ORAL
Qty: 30 TABLET | Refills: 0 | Status: SHIPPED | OUTPATIENT
Start: 2020-01-27 | End: 2020-03-06 | Stop reason: SDUPTHER

## 2020-01-27 RX ORDER — DIVALPROEX SODIUM 125 MG/1
CAPSULE, COATED PELLETS ORAL
Qty: 240 CAPSULE | Refills: 0 | Status: SHIPPED | OUTPATIENT
Start: 2020-01-27 | End: 2020-03-03

## 2020-01-27 RX ORDER — LACOSAMIDE 200 MG/1
TABLET, FILM COATED ORAL
Qty: 60 TABLET | Refills: 0 | Status: SHIPPED | OUTPATIENT
Start: 2020-01-27 | End: 2020-03-02 | Stop reason: SDUPTHER

## 2020-01-27 RX ORDER — LACOSAMIDE 100 MG/1
TABLET, FILM COATED ORAL
Qty: 30 TABLET | Refills: 0 | Status: SHIPPED | OUTPATIENT
Start: 2020-01-27 | End: 2020-03-02 | Stop reason: SDUPTHER

## 2020-01-28 DIAGNOSIS — E03.9 ACQUIRED HYPOTHYROIDISM: ICD-10-CM

## 2020-01-28 RX ORDER — LEVOTHYROXINE SODIUM 0.03 MG/1
25 TABLET ORAL DAILY
Qty: 90 TABLET | Refills: 1 | Status: SHIPPED | OUTPATIENT
Start: 2020-01-28 | End: 2020-08-04

## 2020-03-02 ENCOUNTER — OFFICE VISIT (OUTPATIENT)
Dept: NEUROLOGY | Facility: CLINIC | Age: 45
End: 2020-03-02

## 2020-03-02 VITALS
SYSTOLIC BLOOD PRESSURE: 120 MMHG | HEART RATE: 75 BPM | HEIGHT: 63 IN | DIASTOLIC BLOOD PRESSURE: 82 MMHG | WEIGHT: 90 LBS | BODY MASS INDEX: 15.95 KG/M2

## 2020-03-02 DIAGNOSIS — G40.909 SEIZURE DISORDER (HCC): Primary | ICD-10-CM

## 2020-03-02 DIAGNOSIS — G40.909 SEIZURE DISORDER (HCC): ICD-10-CM

## 2020-03-02 DIAGNOSIS — F81.89 DEVELOPMENTAL NON-VERBAL DISORDER: ICD-10-CM

## 2020-03-02 PROCEDURE — 99214 OFFICE O/P EST MOD 30 MIN: CPT | Performed by: PHYSICIAN ASSISTANT

## 2020-03-02 RX ORDER — LACOSAMIDE 200 MG/1
1 TABLET ORAL 2 TIMES DAILY
Qty: 60 TABLET | Refills: 2 | OUTPATIENT
Start: 2020-03-02 | End: 2020-06-11 | Stop reason: SDUPTHER

## 2020-03-02 RX ORDER — LACOSAMIDE 100 MG/1
100 TABLET ORAL
Qty: 30 TABLET | Refills: 2 | OUTPATIENT
Start: 2020-03-02 | End: 2020-06-04 | Stop reason: SDUPTHER

## 2020-03-02 NOTE — TELEPHONE ENCOUNTER
Pt was seen today by Harshil, rx was called into pt pharmacy. Please sign orders for documentation. Thank you

## 2020-03-02 NOTE — PROGRESS NOTES
Neurology Progress Note      Chief Complaint:    Seizure disorder    Subjective     Subjective:  This is a 44-year-old cognitively impaired female accompanied to the office today by her foster mother, Elie.  She is routinely cared for by Barbi Higuera MD. patient has a history of developmental impairments and seizure disorder.  Last known seizure was in 2013.  Her foster mother has not witnessed one previously.  She has been in her care since 2017.  For this, she uses Depakote sprinkles 500 mg twice daily and Vimpat 200 mg in the morning and 3 mg at night.  She has had no new focal neurologic events no other concerns left since last being seen.      Past Medical History:   Diagnosis Date   • Blind    • Developmental delay    • Seizure (CMS/HCC)    • Seizures (CMS/HCC)    • Toxoplasmosis    • Toxoplasmosis      History reviewed. No pertinent surgical history.  Family History   Problem Relation Age of Onset   • No Known Problems Mother    • No Known Problems Father    • No Known Problems Sister    • No Known Problems Sister    • No Known Problems Maternal Grandmother    • No Known Problems Maternal Grandfather    • No Known Problems Paternal Grandmother    • No Known Problems Paternal Grandfather      Social History     Tobacco Use   • Smoking status: Never Smoker   • Smokeless tobacco: Never Used   Substance Use Topics   • Alcohol use: No   • Drug use: No       Medications:  Current Outpatient Medications   Medication Sig Dispense Refill   • cetirizine (zyrTEC) 10 MG tablet Take 1 tablet by mouth Daily. 90 tablet 2   • [employer] SUNSCREEN SPF 30 lotion Apply sunscreen every 2 hours when in the sun. 237 mL 11   • Divalproex Sodium (DEPAKOTE SPRINKLE) 125 MG capsule TAKE FOUR CAPSULES BY MOUTH EVERY 12 HOURS 240 capsule 0   • folic acid (FOLVITE) 1 MG tablet TAKE ONE TABLET BY MOUTH DAILY 30 tablet 0   • Incontinence Supply Disposable (DEPEND UNDERWEAR SM/MED) misc 6 briefs per day 32 each 12   • Infant Care Products  (BABY WIPES) misc 1 pack per week. 80 each 12   • levothyroxine (SYNTHROID, LEVOTHROID) 25 MCG tablet Take 1 tablet by mouth Daily. 90 tablet 1   • VIMPAT 100 MG tablet tablet TAKE ONE TABLET BY MOUTH EVERY NIGHT AT BEDTIME 30 tablet 0   • VIMPAT 200 MG tablet TAKE ONE TABLET BY MOUTH TWICE A DAY 60 tablet 0     No current facility-administered medications for this visit.        Allergies:    Keppra [levetiracetam]; Lamictal [lamotrigine]; and Topamax [topiramate]    Review of Systems:   -A 14 point review of systems is completed and is negative.      Objective      Vital Signs  Heart Rate:  [75] 75  BP: (120)/(82) 120/82    Physical Exam:    General Exam:  Head:  Normocephalic, atraumatic.  HEENT: PERRLA.  Full EOM.  Neck:  No lymphadenopathy, thyromegaly or bruit.  Cardiac:  Regular rate and rhythm.  Normal S1, S2.  No murmur, rub or gallop.  Lungs:  Clear to auscultation bilaterally.  No wheeze, rales or rhonchi.  Abdomen:  Non-tender, Non-distended.  Bowel sounds normoactive.  Extremities: Full peripheral pulses.  No clubbing, cyanosis or edema.  Skin: No ulceration, breakdown or rash.      Neurologic Exam:  Mental Status:    -Awake. Alert. Oriented to person, place & time.  -No word finding difficulties.  -No aphasia.  -No dysarthria.  -Follows simple commands.     CN II:  Full visual fields with confrontation.  Pupils equally reactive to light.  CN III, IV, VI:  Extraocular muscles function intact with no nystagmus.  CN V:  Facial sensory is symmetric.  CN VII:  Facial motor symmetric.  CN VIII:  Gross hearing intact bilaterally.  CN IX/X:  Palate elevates symmetrically.  CN XI:  Shoulder shrug symmetric.  CN XII:  Tongue is midline on protrusion.     Motor: (strength out of 5:  1= minimal movement, 2 = movement in plane of gravity, 3 = movement against gravity, 4 = movement against some resistance, 5 = full strength)     -5/5 in bilateral biceps, triceps, brachioradialis, wrist extensors and intrinsic muscles  of the hand.    -5/5 in bilateral hip flexors, quadriceps, hamstrings, gastrocsoleus complex, anterior tibialis and extensor hallucis longus.       Deep Tendon Reflexes:  -Right              Biceps: 2+         Triceps: 2+      Brachioradialis: 2+              Patella: 2+       Ankle: 2+           -Left              Biceps: 2+         Triceps: 2+      Brachioradialis: 2+              Patella: 2+       Ankle: 2+             Tone (Modified Rowena Scale):  No appreciable increase in tone or rigidity noted.     Sensory:  -Intact to light touch, pinprick BUE (C5-T1) and BLE (L2-S1).     Coordination:  -Finger to nose intact BUEs  -Heel to shin intact BLEs  -No ataxia     Gait  -No signs of ataxia  -ambulates unassisted       Results Review:    I reviewed the patient's new clinical results and findings.      No components found for: A1C  Lab Results   Component Value Date    HDL 71 01/06/2020    LDL 93 01/06/2020     No components found for: B12  Lab Results   Component Value Date    TSH 1.300 01/06/2020       Assessment/Plan     Impression:  1.  Seizure disorder      Plan:  1.  Continue Depakote sprinkles 125 mg taking 500 mg every 12 hours.  2.  Continue Vimpat 200 mg in the morning 300 mg in the evening.  3.  3-month supply of each called into the pharmacy for her.  4.  Seizure precautions were discussed to include no tub baths, no swimming, avoiding lack of sleep, and avoiding known triggers. Education given of things that may contribute to a seizure to include, but not limited to: stressful situations, fever, fatigue, lack of sleep, low blood sugar, hyperventilation, flashing lights, and caffeine. Instructions given to take seizure medications as prescribed. Education given to family member on what to do during a seizure and care following the seizure. Education given to contact this office prior to stopping or changing any medications.    As the patient has been stable since 2017 without seizure, and because it is  difficult for her caregivers to get her into the office at times, I suggest the possibility of discussing with primary care, Dr. Higuera, her willingness to take over the prescribing of the Depakote sprinkles and Vimpat.  I am certainly happy to continue to manage her, however, her visits here are infrequent and she does see Dr. Higuera approximately every 6 months.    For the ease of her getting to appointments and from a compliance standpoint regarding her medication, I suggested discussing with primary care them assuming the management of her medication.  I am certainly happy to remain available to see her should she have any breakthrough seizure or other difficulties, however, she is stable and therapeutic and I would feel comfortable seeing her less frequently as long as she remains under the care of her current .    Additionally, we discussed with whether or not ongoing monitoring of drug levels is warranted.  There is little clinical evidence to support routine monitoring of drug levels.  I would recommend continued surveillance of CBC and CMP prospectively with primary care as they have already been doing.    The patient's mother voices understanding and agreement with the plan of care and will call for any concerns or questions in the interim.  We reviewed pertinent diagnostic studies and the potential risks and benefits of the prescribed regimen of treatment.    We discussed compliance of the prescribed treatment regimen and instructions on medication, therapy, physical activity, etc. and potential for improvement and impact these have on their healing/recovery and risk reduction in the future.    I spent greater than 25 minutes in direct face to face contact with the patient with greater than 50% of the time being spent in education and counseling.          Harshil Tsai PA-C  03/02/20  11:58 AM

## 2020-03-03 ENCOUNTER — TELEPHONE (OUTPATIENT)
Dept: FAMILY MEDICINE CLINIC | Facility: CLINIC | Age: 45
End: 2020-03-03

## 2020-03-03 RX ORDER — DIVALPROEX SODIUM 125 MG/1
CAPSULE, COATED PELLETS ORAL
Qty: 240 CAPSULE | Refills: 0 | Status: SHIPPED | OUTPATIENT
Start: 2020-03-03 | End: 2020-03-05 | Stop reason: SDUPTHER

## 2020-03-03 NOTE — TELEPHONE ENCOUNTER
DR KOROMA AT [facility] IS REQUESTING THAT DR CROOK TAKE OVER RX FOR Divalproex Sodium (DEPAKOTE SPRINKLE) 125 MG capsule AND lacosamide (VIMPAT) 200 MG tablet. DR KOROMA AT [facility]. PLEASE ADVISE.

## 2020-03-04 NOTE — TELEPHONE ENCOUNTER
depakote was called in yesterday for a 30 supply-no refill due at this time    she takes 2 different dosages for the vimpat and 3 months for each dosage was called in 3/2/2020-no refill due at this time     Pt's next apt is 1/7/2021.      Will pt need an appt next month for refill on depakote? Please advise

## 2020-03-05 ENCOUNTER — DOCUMENTATION (OUTPATIENT)
Dept: FAMILY MEDICINE CLINIC | Facility: CLINIC | Age: 45
End: 2020-03-05

## 2020-03-05 ENCOUNTER — TELEPHONE (OUTPATIENT)
Dept: FAMILY MEDICINE CLINIC | Facility: CLINIC | Age: 45
End: 2020-03-05

## 2020-03-05 DIAGNOSIS — R56.9 GENERALIZED CONVULSIVE SEIZURES (HCC): Primary | ICD-10-CM

## 2020-03-05 RX ORDER — DIVALPROEX SODIUM 125 MG/1
CAPSULE, COATED PELLETS ORAL
Qty: 240 CAPSULE | Refills: 1 | Status: SHIPPED | OUTPATIENT
Start: 2020-03-05 | End: 2020-06-11 | Stop reason: SDUPTHER

## 2020-03-05 NOTE — TELEPHONE ENCOUNTER
I sent an additonal 2 months of depakote so there should be 3 months of meds for each. They can call in 3 months when due again - pt to be seen every 6 months for meds

## 2020-03-05 NOTE — TELEPHONE ENCOUNTER
Pt's  (rick min) is asking for a DC order for sunscreen and for it to be dated for 3/1/2020 and requested to  in office- please advise.

## 2020-03-06 ENCOUNTER — TELEPHONE (OUTPATIENT)
Dept: FAMILY MEDICINE CLINIC | Facility: CLINIC | Age: 45
End: 2020-03-06

## 2020-03-06 DIAGNOSIS — G40.909 SEIZURE DISORDER (HCC): ICD-10-CM

## 2020-03-06 RX ORDER — FOLIC ACID 1 MG/1
1000 TABLET ORAL DAILY
Qty: 90 TABLET | Refills: 1 | Status: SHIPPED | OUTPATIENT
Start: 2020-03-06 | End: 2020-08-04

## 2020-03-06 NOTE — TELEPHONE ENCOUNTER
[facility] CALLED REQUESTING THE folic acid (FOLVITE) 1 MG tablet BE PRESCRIBED BY NELIA.    PLEASE ADVISE.    PHARMACY PHONE NUMBER: [phone]

## 2020-03-09 RX ORDER — FOLIC ACID 1 MG/1
TABLET ORAL
Qty: 30 TABLET | Refills: 0 | OUTPATIENT
Start: 2020-03-09

## 2020-03-17 ENCOUNTER — NURSE TRIAGE (OUTPATIENT)
Dept: CALL CENTER | Facility: HOSPITAL | Age: 45
End: 2020-03-17

## 2020-03-17 NOTE — TELEPHONE ENCOUNTER
"Needs depakote tablets changed to sprinkles at their pharmacy, was ordered incorrectly. Will call tomorrow.     Reason for Disposition  • Caller has NON-URGENT medication question about med that PCP prescribed and triager unable to answer question    Additional Information  • Negative: Drug overdose and nurse unable to answer question  • Negative: Caller requesting information not related to medicine  • Negative: Caller requesting a prescription for Strep throat and has a positive culture result  • Negative: Rash while taking a medication or within 3 days of stopping it  • Negative: Immunization reaction suspected  • Negative: [1] Asthma and [2] having symptoms of asthma (cough, wheezing, etc)  • Negative: MORE THAN A DOUBLE DOSE of a prescription or over-the-counter (OTC) drug  • Negative: [1] DOUBLE DOSE (an extra dose or lesser amount) of over-the-counter (OTC) drug AND [2] any symptoms (e.g., dizziness, nausea, pain, sleepiness)  • Negative: [1] DOUBLE DOSE (an extra dose or lesser amount) of prescription drug AND [2] any symptoms (e.g., dizziness, nausea, pain, sleepiness)  • Negative: Took another person's prescription drug  • Negative: [1] DOUBLE DOSE (an extra dose or lesser amount) of prescription drug AND [2] NO symptoms (Exception: a double dose of antibiotics)  • Negative: Diabetes drug error or overdose (e.g., insulin or extra dose)  • Negative: [1] Request for URGENT new prescription or refill of \"essential\" medication (i.e., likelihood of harm to patient if not taken) AND [2] triager unable to fill per unit policy  • Negative: [1] Prescription not at pharmacy AND [2] was prescribed today by PCP  • Negative: Pharmacy calling with prescription questions and triager unable to answer question  • Negative: Caller has URGENT medication question about med that PCP prescribed and triager unable to answer question    Answer Assessment - Initial Assessment Questions  1. SYMPTOMS: \"Do you have any symptoms?\"     " " Need form of medication changed  2. SEVERITY: If symptoms are present, ask \"Are they mild, moderate or severe?\"      NA    Protocols used: MEDICATION QUESTION CALL-ADULT-      "

## 2020-03-18 ENCOUNTER — TELEPHONE (OUTPATIENT)
Dept: FAMILY MEDICINE CLINIC | Facility: CLINIC | Age: 45
End: 2020-03-18

## 2020-03-18 NOTE — TELEPHONE ENCOUNTER
Patient's care taker called to ask a question about Depakote Rx.    She can be contacted at [phone].

## 2020-03-19 NOTE — TELEPHONE ENCOUNTER
Spoke with foster mother who reports pharmacy fixed medication issue. Patient has medications that she needs.

## 2020-04-16 DIAGNOSIS — R56.9 GENERALIZED CONVULSIVE SEIZURES (HCC): ICD-10-CM

## 2020-04-16 RX ORDER — DIVALPROEX SODIUM 125 MG/1
CAPSULE, COATED PELLETS ORAL
Qty: 240 CAPSULE | Refills: 1 | OUTPATIENT
Start: 2020-04-16

## 2020-05-01 DIAGNOSIS — G40.909 SEIZURE DISORDER (HCC): ICD-10-CM

## 2020-05-01 RX ORDER — LACOSAMIDE 100 MG/1
TABLET, FILM COATED ORAL
Qty: 30 TABLET | Refills: 2 | OUTPATIENT
Start: 2020-05-01

## 2020-05-01 NOTE — TELEPHONE ENCOUNTER
[facility] notified Nely is PRN in our office.  They are going to contact her PCP for refills.

## 2020-05-20 DIAGNOSIS — G40.909 SEIZURE DISORDER (HCC): ICD-10-CM

## 2020-05-20 RX ORDER — LACOSAMIDE 200 MG/1
TABLET, FILM COATED ORAL
Qty: 60 TABLET | Refills: 2 | OUTPATIENT
Start: 2020-05-20

## 2020-05-20 NOTE — TELEPHONE ENCOUNTER
[facility] notified Nely is PRN at our office.  Dr. Higuera has been prescribing her medications.

## 2020-06-04 DIAGNOSIS — G40.909 SEIZURE DISORDER (HCC): ICD-10-CM

## 2020-06-04 RX ORDER — LACOSAMIDE 100 MG/1
100 TABLET ORAL
Qty: 90 TABLET | Refills: 1 | OUTPATIENT
Start: 2020-06-04 | End: 2020-09-04

## 2020-06-04 RX ORDER — LACOSAMIDE 100 MG/1
TABLET, FILM COATED ORAL
Qty: 30 TABLET | Refills: 2 | OUTPATIENT
Start: 2020-06-04

## 2020-06-04 NOTE — TELEPHONE ENCOUNTER
PT's EC, Berta, called to request a refill on the following medication; states PT is completely out of the med at this time: lacosamide (VIMPAT) 100 MG tablet.    Confirmed Pharmacy:   [facility] - [place], [place] - [address] - [phone]  - [phone]  [address]  [facility] [address]  Phone: [phone] Fax: [phone]

## 2020-06-05 DIAGNOSIS — R56.9 GENERALIZED CONVULSIVE SEIZURES (HCC): ICD-10-CM

## 2020-06-05 DIAGNOSIS — G40.909 SEIZURE DISORDER (HCC): ICD-10-CM

## 2020-06-05 RX ORDER — LACOSAMIDE 200 MG/1
TABLET, FILM COATED ORAL
Qty: 60 TABLET | Refills: 2 | OUTPATIENT
Start: 2020-06-05

## 2020-06-05 RX ORDER — LACOSAMIDE 100 MG/1
TABLET, FILM COATED ORAL
Qty: 30 TABLET | Refills: 2 | OUTPATIENT
Start: 2020-06-05

## 2020-06-05 RX ORDER — DIVALPROEX SODIUM 125 MG/1
CAPSULE, COATED PELLETS ORAL
Qty: 240 CAPSULE | Refills: 0 | OUTPATIENT
Start: 2020-06-05

## 2020-06-11 ENCOUNTER — TELEPHONE (OUTPATIENT)
Dept: FAMILY MEDICINE CLINIC | Facility: CLINIC | Age: 45
End: 2020-06-11

## 2020-06-11 DIAGNOSIS — G40.909 SEIZURE DISORDER (HCC): ICD-10-CM

## 2020-06-11 DIAGNOSIS — R56.9 GENERALIZED CONVULSIVE SEIZURES (HCC): ICD-10-CM

## 2020-06-11 RX ORDER — DIVALPROEX SODIUM 125 MG/1
CAPSULE, COATED PELLETS ORAL
Qty: 240 CAPSULE | Refills: 0 | OUTPATIENT
Start: 2020-06-11

## 2020-06-11 RX ORDER — DIVALPROEX SODIUM 125 MG/1
CAPSULE, COATED PELLETS ORAL
Qty: 240 CAPSULE | Refills: 1 | OUTPATIENT
Start: 2020-06-11

## 2020-06-11 RX ORDER — LACOSAMIDE 200 MG/1
1 TABLET ORAL 2 TIMES DAILY
Qty: 180 TABLET | Refills: 0 | Status: SHIPPED | OUTPATIENT
Start: 2020-06-11 | End: 2020-09-24 | Stop reason: SDUPTHER

## 2020-06-11 RX ORDER — DIVALPROEX SODIUM 125 MG/1
CAPSULE, COATED PELLETS ORAL
Qty: 240 CAPSULE | Refills: 0 | Status: SHIPPED | OUTPATIENT
Start: 2020-06-11 | End: 2020-07-09

## 2020-06-11 RX ORDER — CETIRIZINE HYDROCHLORIDE 10 MG/1
10 TABLET ORAL DAILY
Qty: 90 TABLET | Refills: 0 | Status: SHIPPED | OUTPATIENT
Start: 2020-06-11 | End: 2020-08-04

## 2020-06-11 NOTE — TELEPHONE ENCOUNTER
[facility]- [facility], called to request refills on Vimpat 200mg, depakote, and cetirizine.   [facility] states that pt is taking 2 different dosages of Vimpat, 100 mg/1 tab by mouth at bed time, and 200mg/bid.   Kaur states that per agency scripts  every 3 months, regardless of refills.  So she is requesting only 3 moths of each medication to be sent only, and then renew scripts every 3 months.   Please advise.

## 2020-07-09 DIAGNOSIS — R56.9 GENERALIZED CONVULSIVE SEIZURES (HCC): ICD-10-CM

## 2020-07-09 RX ORDER — DIVALPROEX SODIUM 125 MG/1
CAPSULE, COATED PELLETS ORAL
Qty: 240 CAPSULE | Refills: 0 | Status: SHIPPED | OUTPATIENT
Start: 2020-07-09 | End: 2020-08-17

## 2020-07-09 NOTE — TELEPHONE ENCOUNTER
Patient is needing a refill. Patient also needs labs. Patient hasnt had labs since 2018. Please review for order.

## 2020-08-04 DIAGNOSIS — E03.9 ACQUIRED HYPOTHYROIDISM: ICD-10-CM

## 2020-08-04 DIAGNOSIS — G40.909 SEIZURE DISORDER (HCC): ICD-10-CM

## 2020-08-04 RX ORDER — LEVOTHYROXINE SODIUM 0.03 MG/1
TABLET ORAL
Qty: 90 TABLET | Refills: 1 | Status: SHIPPED | OUTPATIENT
Start: 2020-08-04 | End: 2020-08-25 | Stop reason: SDUPTHER

## 2020-08-04 RX ORDER — FOLIC ACID 1 MG/1
1000 TABLET ORAL DAILY
Qty: 90 TABLET | Refills: 1 | Status: SHIPPED | OUTPATIENT
Start: 2020-08-04 | End: 2021-02-08

## 2020-08-04 RX ORDER — CETIRIZINE HYDROCHLORIDE 10 MG/1
10 TABLET ORAL DAILY
Qty: 90 TABLET | Refills: 2 | Status: SHIPPED | OUTPATIENT
Start: 2020-08-04 | End: 2021-05-10

## 2020-08-11 ENCOUNTER — TELEPHONE (OUTPATIENT)
Dept: FAMILY MEDICINE CLINIC | Facility: CLINIC | Age: 45
End: 2020-08-11

## 2020-08-11 DIAGNOSIS — R56.9 GENERALIZED-ONSET SEIZURES (HCC): ICD-10-CM

## 2020-08-13 DIAGNOSIS — R56.9 GENERALIZED CONVULSIVE SEIZURES (HCC): ICD-10-CM

## 2020-08-13 LAB
VALPROATE FREE SERPL-MCNC: 31.7 UG/ML (ref 6–22)
VALPROATE SERPL-MCNC: 125 UG/ML (ref 50–100)

## 2020-08-13 RX ORDER — DIVALPROEX SODIUM 125 MG/1
CAPSULE, COATED PELLETS ORAL
Qty: 240 CAPSULE | Refills: 0 | OUTPATIENT
Start: 2020-08-13

## 2020-08-17 DIAGNOSIS — R56.9 GENERALIZED CONVULSIVE SEIZURES (HCC): ICD-10-CM

## 2020-08-17 RX ORDER — DIVALPROEX SODIUM 125 MG/1
CAPSULE, COATED PELLETS ORAL
Qty: 210 CAPSULE | Refills: 1 | Status: SHIPPED | OUTPATIENT
Start: 2020-08-17 | End: 2020-09-18

## 2020-08-19 DIAGNOSIS — Z79.899 MEDICATION MANAGEMENT: Primary | ICD-10-CM

## 2020-08-25 DIAGNOSIS — E03.9 ACQUIRED HYPOTHYROIDISM: ICD-10-CM

## 2020-08-25 RX ORDER — LEVOTHYROXINE SODIUM 0.03 MG/1
25 TABLET ORAL DAILY
Qty: 90 TABLET | Refills: 1 | Status: SHIPPED | OUTPATIENT
Start: 2020-08-25 | End: 2021-03-01

## 2020-09-03 DIAGNOSIS — G40.909 SEIZURE DISORDER (HCC): ICD-10-CM

## 2020-09-04 RX ORDER — LACOSAMIDE 100 MG/1
TABLET, FILM COATED ORAL
Qty: 90 TABLET | Refills: 1 | Status: SHIPPED | OUTPATIENT
Start: 2020-09-04 | End: 2020-12-30

## 2020-09-18 DIAGNOSIS — R56.9 GENERALIZED CONVULSIVE SEIZURES (HCC): ICD-10-CM

## 2020-09-18 RX ORDER — DIVALPROEX SODIUM 125 MG/1
CAPSULE, COATED PELLETS ORAL
Qty: 210 CAPSULE | Refills: 1 | Status: SHIPPED | OUTPATIENT
Start: 2020-09-18 | End: 2020-12-22

## 2020-09-19 ENCOUNTER — RESULTS ENCOUNTER (OUTPATIENT)
Dept: FAMILY MEDICINE CLINIC | Facility: CLINIC | Age: 45
End: 2020-09-19

## 2020-09-19 DIAGNOSIS — Z79.899 MEDICATION MANAGEMENT: ICD-10-CM

## 2020-09-22 NOTE — PROGRESS NOTES
Called pt's foster mother Elsa to notify of lab repeat- will come one day this week or next to complete.

## 2020-09-24 DIAGNOSIS — G40.909 SEIZURE DISORDER (HCC): ICD-10-CM

## 2020-09-24 RX ORDER — LACOSAMIDE 200 MG/1
1 TABLET, FILM COATED ORAL 2 TIMES DAILY
Qty: 180 TABLET | Refills: 0 | Status: SHIPPED | OUTPATIENT
Start: 2020-09-24 | End: 2020-12-21

## 2020-09-24 RX ORDER — LACOSAMIDE 200 MG/1
TABLET, FILM COATED ORAL
Qty: 180 TABLET | Refills: 0 | OUTPATIENT
Start: 2020-09-24

## 2020-11-10 DIAGNOSIS — G40.909 SEIZURE DISORDER (HCC): ICD-10-CM

## 2020-11-10 DIAGNOSIS — R56.9 GENERALIZED CONVULSIVE SEIZURES (HCC): ICD-10-CM

## 2020-11-10 RX ORDER — LACOSAMIDE 200 MG/1
TABLET, FILM COATED ORAL
Qty: 180 TABLET | Refills: 0 | OUTPATIENT
Start: 2020-11-10

## 2020-11-10 RX ORDER — DIVALPROEX SODIUM 125 MG/1
CAPSULE, COATED PELLETS ORAL
Qty: 210 CAPSULE | Refills: 1 | OUTPATIENT
Start: 2020-11-10

## 2020-11-13 ENCOUNTER — TELEPHONE (OUTPATIENT)
Dept: FAMILY MEDICINE CLINIC | Facility: CLINIC | Age: 45
End: 2020-11-13

## 2020-11-23 ENCOUNTER — TELEPHONE (OUTPATIENT)
Dept: FAMILY MEDICINE CLINIC | Facility: CLINIC | Age: 45
End: 2020-11-23

## 2020-12-21 DIAGNOSIS — G40.909 SEIZURE DISORDER (HCC): ICD-10-CM

## 2020-12-21 RX ORDER — LACOSAMIDE 200 MG/1
TABLET, FILM COATED ORAL
Qty: 180 TABLET | Refills: 0 | Status: SHIPPED | OUTPATIENT
Start: 2020-12-21 | End: 2021-03-02

## 2020-12-22 DIAGNOSIS — R56.9 GENERALIZED CONVULSIVE SEIZURES (HCC): ICD-10-CM

## 2020-12-22 RX ORDER — DIVALPROEX SODIUM 125 MG/1
CAPSULE, COATED PELLETS ORAL
Qty: 210 CAPSULE | Refills: 1 | Status: SHIPPED | OUTPATIENT
Start: 2020-12-22 | End: 2021-01-19

## 2020-12-30 DIAGNOSIS — G40.909 SEIZURE DISORDER (HCC): ICD-10-CM

## 2020-12-30 RX ORDER — LACOSAMIDE 100 MG/1
TABLET, FILM COATED ORAL
Qty: 14 TABLET | Refills: 0 | Status: SHIPPED | OUTPATIENT
Start: 2020-12-30 | End: 2021-04-05

## 2021-01-07 ENCOUNTER — OFFICE VISIT (OUTPATIENT)
Dept: FAMILY MEDICINE CLINIC | Facility: CLINIC | Age: 46
End: 2021-01-07

## 2021-01-07 VITALS
RESPIRATION RATE: 16 BRPM | BODY MASS INDEX: 17.02 KG/M2 | WEIGHT: 96.1 LBS | SYSTOLIC BLOOD PRESSURE: 120 MMHG | TEMPERATURE: 96.1 F | DIASTOLIC BLOOD PRESSURE: 76 MMHG

## 2021-01-07 DIAGNOSIS — Z00.01 ENCOUNTER FOR WELL ADULT EXAM WITH ABNORMAL FINDINGS: Primary | ICD-10-CM

## 2021-01-07 DIAGNOSIS — Z13.220 LIPID SCREENING: ICD-10-CM

## 2021-01-07 DIAGNOSIS — E03.9 ACQUIRED HYPOTHYROIDISM: ICD-10-CM

## 2021-01-07 DIAGNOSIS — K59.04 CHRONIC IDIOPATHIC CONSTIPATION: ICD-10-CM

## 2021-01-07 DIAGNOSIS — F81.89 DEVELOPMENTAL NON-VERBAL DISORDER: ICD-10-CM

## 2021-01-07 DIAGNOSIS — Z13.0 SCREENING FOR DEFICIENCY ANEMIA: ICD-10-CM

## 2021-01-07 DIAGNOSIS — Z13.1 DIABETES MELLITUS SCREENING: ICD-10-CM

## 2021-01-07 DIAGNOSIS — R56.9 GENERALIZED CONVULSIVE SEIZURES (HCC): ICD-10-CM

## 2021-01-07 PROCEDURE — 99396 PREV VISIT EST AGE 40-64: CPT | Performed by: FAMILY MEDICINE

## 2021-01-07 NOTE — PROGRESS NOTES
Subjective cc: wellness exam   Nely Abbasi is a 45 y.o. female who presents with foster mother.   No new concerns   She is eating well at times, sometimes she doesn't.    Good bowel movements - will go every 2-3 days.    Mother is still her guardian, does not see her mother very often, not able to care for her.   Last dental exam in 2020 - due for repeat   No changes with neurology.   No recent seizures.   Past information reviewed and updated as appropriate  ----------------------------------------------------------------  Patient is nonverbal.  She is accompanied today by her foster mother.  All the history is obtained from the foster mother.  She reports that the patient has been disabled since birth.  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  Patient was in an institution for approximately 30 years.  At that time she was told that she did not belong in the institution and was taken out.  The patient's mother could not care for her and she was placed in adult foster care.  Patient lived in an adult home at that time.  Her current foster mother worked at the group home.  There was an incident where the patient left the group home and was found several houses down on someones front porch.  It is the foster mothers belief that she would have been unsupervised for an extended period of time.  At that time they did an emergency withdrawal from the group home.  Patient now lives with her foster mother.  The foster mother reports that she does well at home.  She is able to ambulate about the house.  She spins when she walks.  She can follow some basic commands although very little.  She can usually feed herself.  She is nonverbal.  She does use adult diapers.  The foster mother changes her frequently throughout the day.  If she notices any smear of stool in the diaper they will go to the toilet.  Patient is usually able to have a bowel movement after sitting on the toilet.  Patient does take MiraLAX as  needed for constipation. Patient has a history of seizures which are controlled with current medications.  These medications are managed by neurology.   We discussed screening such as mammogram and Pap smears.  The foster mother declines these screenings.  She does not want to put the patient through that.  Patient has never been sexually active.    Constipation  This is a chronic problem. The current episode started more than 1 year ago. The problem is unchanged. She does not exercise regularly. Pertinent negatives include no abdominal pain, anorexia, back pain, bloating, diarrhea, difficulty urinating, fever, flatus, hematochezia, hemorrhoids, melena, nausea, rectal pain, vomiting or weight loss. Risk factors include immobility. She has tried fiber for the symptoms. The treatment provided moderate relief. There is no history of abdominal surgery, endocrine disease, inflammatory bowel disease, irritable bowel syndrome or metabolic disease.        The following portions of the patient's history were reviewed and updated as appropriate: allergies, current medications, past family history, past medical history, past social history, past surgical history and problem list.        Review of Systems   Unable to perform ROS: Patient nonverbal   Constitutional: Negative for fever and weight loss.   Gastrointestinal: Positive for constipation. Negative for abdominal pain, anorexia, bloating, diarrhea, flatus, hematochezia, hemorrhoids, melena, nausea, rectal pain and vomiting.   Genitourinary: Negative for difficulty urinating.   Musculoskeletal: Negative for back pain.       Objective   Blood pressure 120/76, temperature 96.1 °F (35.6 °C), temperature source Infrared, resp. rate 16, weight 43.6 kg (96 lb 1.6 oz), not currently breastfeeding.  Physical Exam   Constitutional:  Non-toxic appearance. She does not appear ill. No distress.   She is nonverbal.  She will swing her head from side to side.   HENT:   Head: Atraumatic.    Nose: Nose normal.   Eyes: Conjunctivae are normal.   Patient cannot follow commands to test her ocular motion.  However she is looking about the room and there does not appear to be any deficits.   Neck: No tracheal deviation present. No thyromegaly present.   Cardiovascular: Regular rhythm and normal heart sounds.   Pulmonary/Chest: Effort normal and breath sounds normal. No stridor. No respiratory distress. She has no wheezes. She exhibits no tenderness.   Abdominal: Soft. Bowel sounds are normal. She exhibits no distension. There is no abdominal tenderness. There is no guarding.   Lymphadenopathy:     She has no cervical adenopathy.   Neurological: She is alert.   Skin: Skin is warm and dry. No rash noted. She is not diaphoretic. No erythema. There is pallor.   Nursing note and vitals reviewed.      Assessment/Plan   Problems Addressed this Visit        Endocrine and Metabolic    Hypothyroidism    Relevant Orders    T4, Free    TSH       Gastrointestinal Abdominal     Chronic idiopathic constipation       Neuro    Generalized convulsive seizures (CMS/HCC)    Relevant Orders    Valproic Acid Level, Free    Valproic Acid Level, Total    Developmental non-verbal disorder      Other Visit Diagnoses     Encounter for well adult exam with abnormal findings    -  Primary    Lipid screening        Relevant Orders    Lipid Panel    Screening for deficiency anemia        Relevant Orders    CBC (No Diff)    Diabetes mellitus screening        Relevant Orders    Comprehensive Metabolic Panel      Diagnoses       Codes Comments    Encounter for well adult exam with abnormal findings    -  Primary ICD-10-CM: Z00.01  ICD-9-CM: V70.0     Chronic idiopathic constipation     ICD-10-CM: K59.04  ICD-9-CM: 564.00     Generalized convulsive seizures (CMS/HCC)     ICD-10-CM: R56.9  ICD-9-CM: 780.39     Developmental non-verbal disorder     ICD-10-CM: F81.89  ICD-9-CM: 315.2     Acquired hypothyroidism     ICD-10-CM: E03.9  ICD-9-CM:  244.9     Lipid screening     ICD-10-CM: Z13.220  ICD-9-CM: V77.91     Screening for deficiency anemia     ICD-10-CM: Z13.0  ICD-9-CM: V78.1     Diabetes mellitus screening     ICD-10-CM: Z13.1  ICD-9-CM: V77.1           Plan:    #1 hypothyroidism: chronic, labs reviewed - repeat today.   Continue on current dose of medication.    #2 seizures: Chronic, controlled.  Patient follows with neurology.  Her seizures are well controlled on current medications.  Management will be deferred to neurology.    #3 chronic constipation: Chronic, controlled.  Patient takes MiraLAX as needed.      #4 screening: I discussed with foster mom all of the screening that would be appropriate given the patient's age.  This included immunizations, Pap smears, and mammogram.  Adult foster mother declines these screenings at this time.  She states that she does not want to put the patient through these.  I feel this is reasonable as the patient would likely not tolerate them well. Labs today             This document has been electronically signed by Kylie Higuera MD on January 7, 2021 17:57 CST

## 2021-01-12 LAB
ALBUMIN SERPL-MCNC: 3.9 G/DL (ref 3.8–4.8)
ALBUMIN/GLOB SERPL: 1.1 {RATIO} (ref 1.2–2.2)
ALP SERPL-CCNC: 61 IU/L (ref 39–117)
ALT SERPL-CCNC: 7 IU/L (ref 0–32)
AST SERPL-CCNC: 16 IU/L (ref 0–40)
BILIRUB SERPL-MCNC: <0.2 MG/DL (ref 0–1.2)
BUN SERPL-MCNC: 15 MG/DL (ref 6–24)
BUN/CREAT SERPL: 25 (ref 9–23)
CALCIUM SERPL-MCNC: 9.5 MG/DL (ref 8.7–10.2)
CHLORIDE SERPL-SCNC: 101 MMOL/L (ref 96–106)
CHOLEST SERPL-MCNC: 186 MG/DL (ref 100–199)
CO2 SERPL-SCNC: 25 MMOL/L (ref 20–29)
CREAT SERPL-MCNC: 0.6 MG/DL (ref 0.57–1)
ERYTHROCYTE [DISTWIDTH] IN BLOOD BY AUTOMATED COUNT: 12.9 % (ref 11.7–15.4)
GLOBULIN SER CALC-MCNC: 3.5 G/DL (ref 1.5–4.5)
GLUCOSE SERPL-MCNC: 77 MG/DL (ref 65–99)
HCT VFR BLD AUTO: 38.2 % (ref 34–46.6)
HDLC SERPL-MCNC: 80 MG/DL
HGB BLD-MCNC: 13.1 G/DL (ref 11.1–15.9)
LDLC SERPL CALC-MCNC: 84 MG/DL (ref 0–99)
MCH RBC QN AUTO: 30.5 PG (ref 26.6–33)
MCHC RBC AUTO-ENTMCNC: 34.3 G/DL (ref 31.5–35.7)
MCV RBC AUTO: 89 FL (ref 79–97)
PLATELET # BLD AUTO: 226 X10E3/UL (ref 150–450)
POTASSIUM SERPL-SCNC: 4.3 MMOL/L (ref 3.5–5.2)
PROT SERPL-MCNC: 7.4 G/DL (ref 6–8.5)
RBC # BLD AUTO: 4.29 X10E6/UL (ref 3.77–5.28)
SODIUM SERPL-SCNC: 138 MMOL/L (ref 134–144)
T4 FREE SERPL-MCNC: 1.2 NG/DL (ref 0.82–1.77)
TRIGL SERPL-MCNC: 126 MG/DL (ref 0–149)
TSH SERPL DL<=0.005 MIU/L-ACNC: 2.25 UIU/ML (ref 0.45–4.5)
VALPROATE FREE SERPL-MCNC: 20.5 UG/ML (ref 6–22)
VALPROATE SERPL-MCNC: 90 UG/ML (ref 50–100)
VLDLC SERPL CALC-MCNC: 22 MG/DL (ref 5–40)
WBC # BLD AUTO: 6.4 X10E3/UL (ref 3.4–10.8)

## 2021-01-19 DIAGNOSIS — R56.9 GENERALIZED CONVULSIVE SEIZURES (HCC): ICD-10-CM

## 2021-01-19 RX ORDER — DIVALPROEX SODIUM 125 MG/1
CAPSULE, COATED PELLETS ORAL
Qty: 210 CAPSULE | Refills: 1 | Status: SHIPPED | OUTPATIENT
Start: 2021-01-19 | End: 2021-03-23

## 2021-02-08 DIAGNOSIS — G40.909 SEIZURE DISORDER (HCC): ICD-10-CM

## 2021-02-08 RX ORDER — FOLIC ACID 1 MG/1
TABLET ORAL
Qty: 90 TABLET | Refills: 1 | Status: SHIPPED | OUTPATIENT
Start: 2021-02-08 | End: 2021-08-12

## 2021-03-01 DIAGNOSIS — G40.909 SEIZURE DISORDER (HCC): ICD-10-CM

## 2021-03-01 DIAGNOSIS — E03.9 ACQUIRED HYPOTHYROIDISM: ICD-10-CM

## 2021-03-01 RX ORDER — LEVOTHYROXINE SODIUM 0.03 MG/1
TABLET ORAL
Qty: 90 TABLET | Refills: 1 | Status: SHIPPED | OUTPATIENT
Start: 2021-03-01 | End: 2021-11-12

## 2021-03-02 RX ORDER — LACOSAMIDE 200 MG/1
TABLET, FILM COATED ORAL
Qty: 180 TABLET | Refills: 1 | Status: SHIPPED | OUTPATIENT
Start: 2021-03-02 | End: 2021-08-24

## 2021-03-22 DIAGNOSIS — R56.9 GENERALIZED CONVULSIVE SEIZURES (HCC): ICD-10-CM

## 2021-03-23 RX ORDER — DIVALPROEX SODIUM 125 MG/1
CAPSULE, COATED PELLETS ORAL
Qty: 210 CAPSULE | Refills: 1 | Status: SHIPPED | OUTPATIENT
Start: 2021-03-23 | End: 2021-05-19

## 2021-04-05 DIAGNOSIS — G40.909 SEIZURE DISORDER (HCC): ICD-10-CM

## 2021-04-05 RX ORDER — LACOSAMIDE 100 MG/1
TABLET, FILM COATED ORAL
Qty: 90 TABLET | Refills: 1 | Status: SHIPPED | OUTPATIENT
Start: 2021-04-05 | End: 2021-08-31

## 2021-05-10 RX ORDER — CETIRIZINE HYDROCHLORIDE 10 MG/1
TABLET ORAL
Qty: 90 TABLET | Refills: 3 | Status: SHIPPED | OUTPATIENT
Start: 2021-05-10 | End: 2022-05-12

## 2021-05-11 DIAGNOSIS — E03.9 ACQUIRED HYPOTHYROIDISM: ICD-10-CM

## 2021-05-12 RX ORDER — LEVOTHYROXINE SODIUM 0.03 MG/1
TABLET ORAL
Qty: 90 TABLET | Refills: 1 | OUTPATIENT
Start: 2021-05-12

## 2021-05-19 DIAGNOSIS — R56.9 GENERALIZED CONVULSIVE SEIZURES (HCC): ICD-10-CM

## 2021-05-19 RX ORDER — DIVALPROEX SODIUM 125 MG/1
CAPSULE, COATED PELLETS ORAL
Qty: 210 CAPSULE | Refills: 1 | Status: SHIPPED | OUTPATIENT
Start: 2021-05-19 | End: 2021-07-23

## 2021-07-19 DIAGNOSIS — R56.9 GENERALIZED CONVULSIVE SEIZURES (HCC): ICD-10-CM

## 2021-07-20 RX ORDER — DIVALPROEX SODIUM 125 MG/1
CAPSULE, COATED PELLETS ORAL
Qty: 210 CAPSULE | Refills: 1 | OUTPATIENT
Start: 2021-07-20

## 2021-07-21 DIAGNOSIS — R56.9 GENERALIZED CONVULSIVE SEIZURES (HCC): ICD-10-CM

## 2021-07-23 RX ORDER — DIVALPROEX SODIUM 125 MG/1
CAPSULE, COATED PELLETS ORAL
Qty: 210 CAPSULE | Refills: 1 | Status: SHIPPED | OUTPATIENT
Start: 2021-07-23 | End: 2021-09-16

## 2021-08-11 DIAGNOSIS — G40.909 SEIZURE DISORDER (HCC): ICD-10-CM

## 2021-08-12 RX ORDER — FOLIC ACID 1 MG/1
TABLET ORAL
Qty: 90 TABLET | Refills: 1 | Status: SHIPPED | OUTPATIENT
Start: 2021-08-12 | End: 2022-01-25

## 2021-08-24 DIAGNOSIS — G40.909 SEIZURE DISORDER (HCC): ICD-10-CM

## 2021-08-24 RX ORDER — LACOSAMIDE 200 MG/1
TABLET, FILM COATED ORAL
Qty: 60 TABLET | Refills: 1 | Status: SHIPPED | OUTPATIENT
Start: 2021-08-24 | End: 2021-10-20

## 2021-08-30 DIAGNOSIS — G40.909 SEIZURE DISORDER (HCC): ICD-10-CM

## 2021-08-31 RX ORDER — LACOSAMIDE 100 MG/1
TABLET, FILM COATED ORAL
Qty: 30 TABLET | Refills: 1 | Status: SHIPPED | OUTPATIENT
Start: 2021-08-31 | End: 2021-10-29

## 2021-09-15 DIAGNOSIS — R56.9 GENERALIZED CONVULSIVE SEIZURES (HCC): ICD-10-CM

## 2021-09-16 RX ORDER — DIVALPROEX SODIUM 125 MG/1
CAPSULE, COATED PELLETS ORAL
Qty: 210 CAPSULE | Refills: 1 | Status: SHIPPED | OUTPATIENT
Start: 2021-09-16 | End: 2021-11-18

## 2021-09-16 NOTE — TELEPHONE ENCOUNTER
Rx Refill Note  Requested Prescriptions     Pending Prescriptions Disp Refills   • Divalproex Sodium (DEPAKOTE SPRINKLE) 125 MG capsule [Pharmacy Med Name: DIVALPROEX SODIUM 125 MG  Capsule Delayed Release Sprinkle] 210 capsule 1     Sig: TAKE THREE CAPSULES BY MOUTH EVERY MORNING AND 4 CAPSULES BY MOUTH EVERY NIGHT AT BEDTIME      Last office visit with prescribing clinician: 1/7/2021      Next office visit with prescribing clinician: 1/13/2022            Chloe Clemente MA  09/16/21, 07:46 CDT

## 2021-10-11 ENCOUNTER — CLINICAL SUPPORT (OUTPATIENT)
Dept: FAMILY MEDICINE CLINIC | Facility: CLINIC | Age: 46
End: 2021-10-11

## 2021-10-11 ENCOUNTER — TELEMEDICINE (OUTPATIENT)
Dept: FAMILY MEDICINE CLINIC | Facility: CLINIC | Age: 46
End: 2021-10-11

## 2021-10-11 DIAGNOSIS — J06.9 UPPER RESPIRATORY TRACT INFECTION, UNSPECIFIED TYPE: Primary | ICD-10-CM

## 2021-10-11 DIAGNOSIS — Z20.822 CLOSE EXPOSURE TO COVID-19 VIRUS: ICD-10-CM

## 2021-10-11 DIAGNOSIS — J06.9 UPPER RESPIRATORY TRACT INFECTION, UNSPECIFIED TYPE: ICD-10-CM

## 2021-10-11 LAB — SARS-COV-2 ORF1AB RESP QL NAA+PROBE: DETECTED

## 2021-10-11 PROCEDURE — U0004 COV-19 TEST NON-CDC HGH THRU: HCPCS | Performed by: NURSE PRACTITIONER

## 2021-10-11 PROCEDURE — 99213 OFFICE O/P EST LOW 20 MIN: CPT | Performed by: NURSE PRACTITIONER

## 2021-10-11 NOTE — PROGRESS NOTES
Chief Complaint  URI (x10 days)    Subjective          Nely Abbasi presents via telehealth video zoom visit with [facility]  History of Present Illness  Patient is nonverbal. History obtained by caregiver, Elsa.   Uri  New. Started 10 days ago. Cough, rhinorrhea, feverish subjective, fatigue, diarrhea, vomiting x1 last night. Does not seem short of breath.   Household members + covid.     Objective   Vital Signs:   There were no vitals taken for this visit.    Physical Exam   No vital signs or bmi obtained for this encounter.      Physical exam-  · General appearance- Alert, appears ill. Dressed appropriately. No distress.   · HEENT- external examination of eyes, ears and nose all normal. Head is atraumatic. Hearing normal.   · Normal respiratory effort.         Result Review :                 Assessment and Plan    Diagnoses and all orders for this visit:    1. Upper respiratory tract infection, unspecified type (Primary)  -     COVID-19,APTIMA PANTHER,PAD IN-HOUSE,NP/OP/NASAL SWAB IN UTM/VTM/SALINE/LIQUID AMIES TRANSPORT MEDIA/NP WASH OR ASPIRATE, 24 HR TAT - Swab, Oropharynx; Future    2. Close exposure to COVID-19 virus  -     COVID-19,APTIMA PANTHER,PAD IN-HOUSE,NP/OP/NASAL SWAB IN UTM/VTM/SALINE/LIQUID AMIES TRANSPORT MEDIA/NP WASH OR ASPIRATE, 24 HR TAT - Swab, Oropharynx; Future        Follow Up   Return if symptoms worsen or fail to improve.  Patient was given instructions and counseling regarding her condition or for health maintenance advice. Please see specific information pulled into the AVS if appropriate.

## 2021-10-12 ENCOUNTER — HOSPITAL ENCOUNTER (EMERGENCY)
Age: 46
Discharge: HOME | End: 2021-10-12
Payer: MEDICAID

## 2021-10-12 ENCOUNTER — APPOINTMENT (OUTPATIENT)
Dept: GENERAL RADIOLOGY | Age: 46
End: 2021-10-12
Payer: MEDICAID

## 2021-10-12 VITALS
HEART RATE: 73 BPM | OXYGEN SATURATION: 98 % | TEMPERATURE: 97.6 F | SYSTOLIC BLOOD PRESSURE: 111 MMHG | RESPIRATION RATE: 20 BRPM | WEIGHT: 110 LBS | DIASTOLIC BLOOD PRESSURE: 79 MMHG

## 2021-10-12 DIAGNOSIS — R11.2 NON-INTRACTABLE VOMITING WITH NAUSEA, UNSPECIFIED VOMITING TYPE: ICD-10-CM

## 2021-10-12 DIAGNOSIS — U07.1 COVID-19 VIRUS INFECTION: Primary | ICD-10-CM

## 2021-10-12 PROCEDURE — 99283 EMERGENCY DEPT VISIT LOW MDM: CPT

## 2021-10-12 PROCEDURE — 71045 X-RAY EXAM CHEST 1 VIEW: CPT

## 2021-10-12 RX ORDER — ONDANSETRON 4 MG/1
4 TABLET, ORALLY DISINTEGRATING ORAL EVERY 8 HOURS PRN
Qty: 10 TABLET | Refills: 0 | Status: SHIPPED | OUTPATIENT
Start: 2021-10-12

## 2021-10-12 ASSESSMENT — ENCOUNTER SYMPTOMS
COUGH: 1
VOMITING: 1
SHORTNESS OF BREATH: 1
NAUSEA: 1

## 2021-10-13 NOTE — ED PROVIDER NOTES
[facility]  eMERGENCY dEPARTMENT eNCOUnter      Pt Name: Maria Del Rosario Gomez  MRN: [id number]  [place] 1975  Date of evaluation: 10/12/2021  Provider: FELISA Gonzalez    CHIEF COMPLAINT       Chief Complaint   Patient presents with   Lazarus Cruz     family wants pt checked out because she tested positive for covid yesterday; entire household has been positive for the past week per EMS         HISTORY OF PRESENT ILLNESS   (Location/Symptom, Timing/Onset,Context/Setting, Quality, Duration, Modifying Factors, Severity)  Note limiting factors. Ginette Herris a 41 y.o. female who presents to the emergency department for evaluation of vomiting and shortness of breath. Pt presents having history of MR. She is here with her  who tells me patient has been ill over past 9 days with covid symptoms. Caregiver relates that she herself has had covid recently. Pt tested positive for covid yesterday at her pcp office. She relates that tonight patient had episode of nausea and vomiting followed by cough and shortness of breath. She gives concern for patient having PNA. Pt has had no fevers. She has had normal po intake of fluids with somewhat diminished appetite for food. She relates that patient continues to be ambulatory. HPI    Nursing Notes were reviewed. REVIEW OF SYSTEMS    (2-9 systems for level 4, 10 or more for level 5)     Review of Systems   Constitutional: Positive for activity change and appetite change. Respiratory: Positive for cough and shortness of breath. Gastrointestinal: Positive for nausea and vomiting (episode today and two days ago). A complete review of systems was performed and is negative except as noted above in the HPI. PAST MEDICAL HISTORY     Past Medical History:   Diagnosis Date    Encephalopathy     Intellectual disability     Nonverbal     Seizures ([address]          SURGICAL HISTORY     History reviewed.  No pertinent surgical history. CURRENT MEDICATIONS       Discharge Medication List as of 10/12/2021 11:27 PM          ALLERGIES     Depakote [divalproex sodium], Haldol [haloperidol], Keppra [levetiracetam], Lamictal [lamotrigine], and Topamax [topiramate]    FAMILY HISTORY     History reviewed. No pertinent family history. SOCIAL HISTORY       Social History     Socioeconomic History    Marital status: Single     Spouse name: None    Number of children: None    Years of education: None    Highest education level: None   Occupational History    None   Tobacco Use    Smoking status: Never Smoker    Smokeless tobacco: Never Used   Vaping Use    Vaping Use: Never used   Substance and Sexual Activity    Alcohol use: Not Currently    Drug use: Never    Sexual activity: None   Other Topics Concern    None   Social History Narrative    None     Social Determinants of Health     Financial Resource Strain:     Difficulty of Paying Living Expenses:    Food Insecurity:     Worried About Running Out of Food in the Last Year:     Ran Out of Food in the Last Year:    Transportation Needs:     Lack of Transportation (Medical):      Lack of Transportation (Non-Medical):    Physical Activity:     Days of Exercise per Week:     Minutes of Exercise per Session:    Stress:     Feeling of Stress :    Social Connections:     Frequency of Communication with Friends and Family:     Frequency of Social Gatherings with Friends and Family:     Attends Nondenominational Services:     Active Member of Clubs or Organizations:     Attends Club or Organization Meetings:     Marital Status:    Intimate Partner Violence:     Fear of Current or Ex-Partner:     Emotionally Abused:     Physically Abused:     Sexually Abused:        SCREENINGS             PHYSICAL EXAM    (up to 7 for level 4, 8 or more for level 5)     ED Triage Vitals   BP Temp Temp Source Pulse Resp SpO2 Height Weight   10/12/21 2129 10/12/21 2133 10/12/21 2133 10/12/21 2129 10/12/21 2129 10/12/21 2129 -- 10/12/21 2129   128/82 97.6 °F (36.4 °C) Axillary 72 17 98 %  110 lb (49.9 kg)       Physical Exam  Vitals reviewed. Constitutional:       Comments: Nontoxic, well hydrated appearing 41 yr old female with parent at bedside   HENT:      Right Ear: External ear normal.      Left Ear: External ear normal.   Eyes:      Conjunctiva/sclera: Conjunctivae normal.      Pupils: Pupils are equal, round, and reactive to light. Cardiovascular:      Rate and Rhythm: Normal rate and regular rhythm. Heart sounds: Normal heart sounds. Pulmonary:      Effort: Pulmonary effort is normal.      Breath sounds: Normal breath sounds. Abdominal:      General: Bowel sounds are normal.      Palpations: Abdomen is soft. Musculoskeletal:         General: Normal range of motion. Cervical back: Normal range of motion. Skin:     General: Skin is warm and dry. Neurological:      Mental Status: She is alert and oriented to person, place, and time. DIAGNOSTIC RESULTS     EKG: All EKG's are interpreted by the Emergency Department Physician who either signs or Co-signs this chart in the absence of acardiologist.        RADIOLOGY:   Non-plain film images such as CT, Ultrasound andMRI are read by the radiologist. Plain radiographic images are visualized and preliminarily interpreted by the emergency physician with the below findings:        Interpretation per the Radiologist below, if available at the time of this note:    XR CHEST PORTABLE   Final Result   1.. Remote granulomatous disease. Otherwise normal chest.   Signed by Dr Randolph [facility]            ED BEDSIDE ULTRASOUND:   Performed by ED Physician - none    LABS:  Labs Reviewed - No data to display    All other labs were within normal range or not returned as of this dictation. RE-ASSESSMENT       Pt is tolerating po fluids and remains in no respiratory distress at discharge.      EMERGENCY DEPARTMENT COURSE and DIFFERENTIALDIAGNOSIS/MDM:   Vitals:    Vitals:    10/12/21 2129 10/12/21 2133 10/12/21 2332   BP: 128/82  111/79   Pulse: 72  73   Resp: 17  20   Temp:  97.6 °F (36.4 °C)    TempSrc:  Axillary    SpO2: 98%  98%   Weight: 110 lb (49.9 kg)         MDM      CONSULTS:  None    PROCEDURES:  Unless otherwise notedbelow, none     Procedures    FINAL IMPRESSION     1. COVID-19 virus infection    2.  Non-intractable vomiting with nausea, unspecified vomiting type          DISPOSITION/PLAN   DISPOSITION Decision To Discharge 10/12/2021 11:27:00 PM      PATIENT REFERRED TO:  Sarah Wilson MD  [address] [id number]  [phone]    Schedule an appointment as soon as possible for a visit   As needed      DISCHARGE MEDICATIONS:       Discharge Medication List as of 10/12/2021 11:27 PM           Medication List      START taking these medications    ondansetron 4 MG disintegrating tablet  Commonly known as: Zofran ODT  Take 1 tablet by mouth every 8 hours as needed for Nausea or Vomiting           Where to Get Your Medications      You can get these medications from any pharmacy    Bring a paper prescription for each of these medications  · ondansetron 4 MG disintegrating tablet           (Pleasenote that portions of this note were completed with a voice recognition program.  Efforts were made to edit the dictations but occasionally words are mis-transcribed.)              Yo Silva, FELISA  10/13/21 0217

## 2021-10-19 DIAGNOSIS — G40.909 SEIZURE DISORDER (HCC): ICD-10-CM

## 2021-10-19 NOTE — TELEPHONE ENCOUNTER
Rx Refill Note  Requested Prescriptions     Pending Prescriptions Disp Refills   • Vimpat 200 MG tablet [Pharmacy Med Name: VIMPAT 200 MG  Tablet] 60 tablet 1     Sig: TAKE ONE TABLET BY MOUTH TWICE A DAY      Last office visit with prescribing clinician: 1/7/2021      Next office visit with prescribing clinician: 1/13/2022   Last refill: 8/24/2021    Alix Rey MA  10/19/21, 15:35 CDT

## 2021-10-20 RX ORDER — LACOSAMIDE 200 MG/1
TABLET, FILM COATED ORAL
Qty: 60 TABLET | Refills: 1 | Status: SHIPPED | OUTPATIENT
Start: 2021-10-20 | End: 2021-12-27

## 2021-10-29 DIAGNOSIS — G40.909 SEIZURE DISORDER (HCC): ICD-10-CM

## 2021-10-29 RX ORDER — LACOSAMIDE 100 MG/1
TABLET, FILM COATED ORAL
Qty: 30 TABLET | Refills: 1 | Status: SHIPPED | OUTPATIENT
Start: 2021-10-29 | End: 2022-01-26

## 2021-10-29 NOTE — TELEPHONE ENCOUNTER
Rx Refill Note  Requested Prescriptions     Pending Prescriptions Disp Refills   • Vimpat 100 MG tablet tablet [Pharmacy Med Name: VIMPAT 100 MG  Tablet] 30 tablet 1     Sig: TAKE ONE TABLET BY MOUTH EVERY NIGHT AT BEDTIME DAILY      Last office visit with prescribing clinician: 1/7/2021      Next office visit with prescribing clinician: 1/13/2022    LRX:10/20/21-2 months     Ekaterina Hansen MA  10/29/21, 09:44 CDT

## 2021-11-11 DIAGNOSIS — E03.9 ACQUIRED HYPOTHYROIDISM: ICD-10-CM

## 2021-11-12 RX ORDER — LEVOTHYROXINE SODIUM 0.03 MG/1
TABLET ORAL
Qty: 90 TABLET | Refills: 1 | Status: SHIPPED | OUTPATIENT
Start: 2021-11-12 | End: 2022-05-11

## 2021-11-12 NOTE — TELEPHONE ENCOUNTER
Rx Refill Note  Requested Prescriptions     Pending Prescriptions Disp Refills   • levothyroxine (SYNTHROID, LEVOTHROID) 25 MCG tablet [Pharmacy Med Name: LEVOTHYROXINE SODIUM 25 MCG 25 Tablet] 90 tablet 1     Sig: TAKE ONE TABLET BY MOUTH DAILY      Last office visit with prescribing clinician: 10/11/21-sick    Next office visit with prescribing clinician: 1/13/2022     LRX:3/1/21-6 months   Last labs:1/7/2021      Ekaterina Hansen MA  11/12/21, 15:49 CST

## 2021-11-18 DIAGNOSIS — R56.9 GENERALIZED CONVULSIVE SEIZURES (HCC): ICD-10-CM

## 2021-11-18 RX ORDER — DIVALPROEX SODIUM 125 MG/1
CAPSULE, COATED PELLETS ORAL
Qty: 210 CAPSULE | Refills: 1 | Status: SHIPPED | OUTPATIENT
Start: 2021-11-18 | End: 2022-01-13

## 2021-11-18 NOTE — TELEPHONE ENCOUNTER
Rx Refill Note  Requested Prescriptions     Pending Prescriptions Disp Refills   • Divalproex Sodium (DEPAKOTE SPRINKLE) 125 MG capsule [Pharmacy Med Name: DIVALPROEX SODIUM 125 MG  Capsule Delayed Release Sprinkle] 210 capsule 1     Sig: TAKE THREE CAPSULES BY MOUTH EVERY MORNING AND 4 CAPSULES BY MOUTH EVERY NIGHT AT BEDTIME      Last office visit with prescribing clinician: 1/7/2021      Next office visit with prescribing clinician: 1/13/2022  Last refill: 9/16/2021     Alix Rey MA  11/18/21, 09:11 CST

## 2021-12-01 DIAGNOSIS — G40.909 SEIZURE DISORDER (HCC): ICD-10-CM

## 2021-12-01 RX ORDER — FOLIC ACID 1 MG/1
TABLET ORAL
Qty: 90 TABLET | Refills: 1 | OUTPATIENT
Start: 2021-12-01

## 2021-12-01 NOTE — TELEPHONE ENCOUNTER
Rx Refill Note  Requested Prescriptions     Pending Prescriptions Disp Refills   • folic acid (FOLVITE) 1 MG tablet [Pharmacy Med Name: FOLIC ACID 1 MG TABS 1 Tablet] 90 tablet 1     Sig: TAKE ONE TABLET BY MOUTH DAILY      Last office visit with prescribing clinician: 10/11/21     Next office visit with prescribing clinician: 1/13/2022      Last refill: 8/21/21             Shira Holland  12/01/21, 14:41 CST

## 2021-12-27 DIAGNOSIS — G40.909 SEIZURE DISORDER (HCC): ICD-10-CM

## 2021-12-27 RX ORDER — LACOSAMIDE 200 MG/1
TABLET, FILM COATED ORAL
Qty: 60 TABLET | Refills: 0 | Status: SHIPPED | OUTPATIENT
Start: 2021-12-27 | End: 2022-02-21

## 2021-12-27 RX ORDER — LACOSAMIDE 100 MG/1
TABLET, FILM COATED ORAL
Qty: 30 TABLET | Refills: 1 | OUTPATIENT
Start: 2021-12-27

## 2021-12-27 NOTE — TELEPHONE ENCOUNTER
Rx Refill Note  Requested Prescriptions     Pending Prescriptions Disp Refills   • Vimpat 100 MG tablet tablet [Pharmacy Med Name: VIMPAT 100 MG  Tablet] 30 tablet 1     Sig: TAKE ONE TABLET BY MOUTH EVERY NIGHT AT BEDTIME DAILY   • Vimpat 200 MG tablet [Pharmacy Med Name: VIMPAT 200 MG  Tablet] 60 tablet 1     Sig: TAKE ONE TABLET BY MOUTH TWICE A DAY      Last office visit with prescribing clinician: 1/7/2021      Next office visit with prescribing clinician: 1/13/2022            Lisa Farrar LPN  12/27/21, 11:41 CST

## 2022-01-10 DIAGNOSIS — R56.9 GENERALIZED CONVULSIVE SEIZURES: ICD-10-CM

## 2022-01-12 NOTE — TELEPHONE ENCOUNTER
Rx Refill Note  Requested Prescriptions     Pending Prescriptions Disp Refills   • Divalproex Sodium (DEPAKOTE SPRINKLE) 125 MG capsule [Pharmacy Med Name: DIVALPROEX SODIUM 125 MG  Capsule Delayed Release Sprinkle] 210 capsule 1     Sig: TAKE THREE CAPSULES BY MOUTH EVERY MORNING AND 4 CAPSULES BY MOUTH EVERY NIGHT AT BEDTIME      Last office visit with prescribing clinician: 01/07/2021     Next office visit with prescribing clinician: 01/13/2022           Lisa Farrar LPN  01/12/22, 14:59 CST

## 2022-01-13 ENCOUNTER — OFFICE VISIT (OUTPATIENT)
Dept: FAMILY MEDICINE CLINIC | Facility: CLINIC | Age: 47
End: 2022-01-13

## 2022-01-13 VITALS
DIASTOLIC BLOOD PRESSURE: 64 MMHG | OXYGEN SATURATION: 94 % | HEART RATE: 62 BPM | TEMPERATURE: 97.5 F | SYSTOLIC BLOOD PRESSURE: 108 MMHG | BODY MASS INDEX: 17.32 KG/M2 | WEIGHT: 97.8 LBS | RESPIRATION RATE: 16 BRPM

## 2022-01-13 DIAGNOSIS — Z13.1 DIABETES MELLITUS SCREENING: ICD-10-CM

## 2022-01-13 DIAGNOSIS — F81.89 DEVELOPMENTAL NON-VERBAL DISORDER: ICD-10-CM

## 2022-01-13 DIAGNOSIS — Z13.220 LIPID SCREENING: ICD-10-CM

## 2022-01-13 DIAGNOSIS — Z00.01 ENCOUNTER FOR WELL ADULT EXAM WITH ABNORMAL FINDINGS: ICD-10-CM

## 2022-01-13 DIAGNOSIS — Z13.0 SCREENING FOR DEFICIENCY ANEMIA: ICD-10-CM

## 2022-01-13 DIAGNOSIS — E03.9 ACQUIRED HYPOTHYROIDISM: ICD-10-CM

## 2022-01-13 DIAGNOSIS — R56.9 GENERALIZED CONVULSIVE SEIZURES: Primary | ICD-10-CM

## 2022-01-13 DIAGNOSIS — Z11.59 NEED FOR HEPATITIS C SCREENING TEST: ICD-10-CM

## 2022-01-13 PROCEDURE — 99396 PREV VISIT EST AGE 40-64: CPT | Performed by: FAMILY MEDICINE

## 2022-01-13 RX ORDER — DIVALPROEX SODIUM 125 MG/1
CAPSULE, COATED PELLETS ORAL
Qty: 210 CAPSULE | Refills: 1 | Status: SHIPPED | OUTPATIENT
Start: 2022-01-13 | End: 2022-03-17

## 2022-01-21 LAB
ALBUMIN SERPL-MCNC: 4.3 G/DL (ref 3.8–4.8)
ALBUMIN/GLOB SERPL: 1.3 {RATIO} (ref 1.2–2.2)
ALP SERPL-CCNC: 72 IU/L (ref 44–121)
ALT SERPL-CCNC: 10 IU/L (ref 0–32)
AST SERPL-CCNC: 18 IU/L (ref 0–40)
BILIRUB SERPL-MCNC: <0.2 MG/DL (ref 0–1.2)
BUN SERPL-MCNC: 19 MG/DL (ref 6–24)
BUN/CREAT SERPL: 31 (ref 9–23)
CALCIUM SERPL-MCNC: 9.3 MG/DL (ref 8.7–10.2)
CHLORIDE SERPL-SCNC: 102 MMOL/L (ref 96–106)
CHOLEST SERPL-MCNC: 190 MG/DL (ref 100–199)
CO2 SERPL-SCNC: 23 MMOL/L (ref 20–29)
CREAT SERPL-MCNC: 0.62 MG/DL (ref 0.57–1)
ERYTHROCYTE [DISTWIDTH] IN BLOOD BY AUTOMATED COUNT: 13.9 % (ref 11.7–15.4)
GLOBULIN SER CALC-MCNC: 3.2 G/DL (ref 1.5–4.5)
GLUCOSE SERPL-MCNC: 88 MG/DL (ref 65–99)
HCT VFR BLD AUTO: 42.1 % (ref 34–46.6)
HCV AB S/CO SERPL IA: <0.1 S/CO RATIO (ref 0–0.9)
HDLC SERPL-MCNC: 65 MG/DL
HGB BLD-MCNC: 14 G/DL (ref 11.1–15.9)
LDLC SERPL CALC-MCNC: 111 MG/DL (ref 0–99)
MCH RBC QN AUTO: 29.4 PG (ref 26.6–33)
MCHC RBC AUTO-ENTMCNC: 33.3 G/DL (ref 31.5–35.7)
MCV RBC AUTO: 88 FL (ref 79–97)
PLATELET # BLD AUTO: 267 X10E3/UL (ref 150–450)
POTASSIUM SERPL-SCNC: 4 MMOL/L (ref 3.5–5.2)
PROT SERPL-MCNC: 7.5 G/DL (ref 6–8.5)
RBC # BLD AUTO: 4.77 X10E6/UL (ref 3.77–5.28)
SODIUM SERPL-SCNC: 140 MMOL/L (ref 134–144)
SPECIMEN STATUS: NORMAL
T4 FREE SERPL-MCNC: 1.09 NG/DL (ref 0.82–1.77)
TRIGL SERPL-MCNC: 76 MG/DL (ref 0–149)
TSH SERPL DL<=0.005 MIU/L-ACNC: 3.1 UIU/ML (ref 0.45–4.5)
VALPROATE FREE SERPL-MCNC: NORMAL UG/ML
VALPROATE SERPL-MCNC: 79 UG/ML (ref 50–100)
VLDLC SERPL CALC-MCNC: 14 MG/DL (ref 5–40)
WBC # BLD AUTO: 5.2 X10E3/UL (ref 3.4–10.8)

## 2022-01-24 DIAGNOSIS — G40.909 SEIZURE DISORDER: ICD-10-CM

## 2022-01-24 NOTE — TELEPHONE ENCOUNTER
Refill too soon    Rx Refill Note  Requested Prescriptions     Pending Prescriptions Disp Refills   • folic acid (FOLVITE) 1 MG tablet [Pharmacy Med Name: FOLIC ACID 1 MG TABS 1 Tablet] 90 tablet 1     Sig: TAKE ONE TABLET BY MOUTH DAILY      Last office visit with prescribing clinician: Visit date not found      Next office visit with prescribing clinician: Visit date not found            Danelle Calles MA  01/24/22, 11:10 CST

## 2022-01-25 RX ORDER — FOLIC ACID 1 MG/1
TABLET ORAL
Qty: 90 TABLET | Refills: 1 | Status: SHIPPED | OUTPATIENT
Start: 2022-01-25 | End: 2022-06-14

## 2022-01-26 DIAGNOSIS — G40.909 SEIZURE DISORDER: ICD-10-CM

## 2022-01-26 RX ORDER — LACOSAMIDE 100 MG/1
TABLET, FILM COATED ORAL
Qty: 30 TABLET | Refills: 1 | Status: SHIPPED | OUTPATIENT
Start: 2022-01-26 | End: 2022-03-23 | Stop reason: SDUPTHER

## 2022-01-26 NOTE — TELEPHONE ENCOUNTER
Rx Refill Note  Requested Prescriptions     Pending Prescriptions Disp Refills   • Vimpat 100 MG tablet tablet [Pharmacy Med Name: VIMPAT 100 MG  Tablet] 30 tablet 1     Sig: TAKE ONE TABLET BY MOUTH EVERY NIGHT AT BEDTIME DAILY      Last office visit with prescribing clinician: 1/13/2022      Next office visit with prescribing clinician: 1/17/2023            Danelle Calles MA  01/26/22, 10:13 CST

## 2022-02-21 DIAGNOSIS — G40.909 SEIZURE DISORDER: ICD-10-CM

## 2022-02-21 RX ORDER — LACOSAMIDE 200 MG/1
TABLET, FILM COATED ORAL
Qty: 60 TABLET | Refills: 0 | Status: SHIPPED | OUTPATIENT
Start: 2022-02-21 | End: 2022-03-23

## 2022-02-21 RX ORDER — LACOSAMIDE 100 MG/1
TABLET, FILM COATED ORAL
Qty: 30 TABLET | Refills: 1 | OUTPATIENT
Start: 2022-02-21

## 2022-02-21 NOTE — TELEPHONE ENCOUNTER
Rx Refill Note  Requested Prescriptions     Pending Prescriptions Disp Refills   • Vimpat 200 MG tablet [Pharmacy Med Name: VIMPAT 200 MG  Tablet] 60 tablet 0     Sig: TAKE ONE TABLET BY MOUTH TWICE A DAY      Last office visit with prescribing clinician: 01/13/2022     Next office visit with prescribing clinician: 01/17/2023            Danelle Calles MA  02/21/22, 15:27 CST

## 2022-02-21 NOTE — TELEPHONE ENCOUNTER
Refill too soon     Rx Refill Note  Requested Prescriptions     Pending Prescriptions Disp Refills   • Vimpat 100 MG tablet tablet [Pharmacy Med Name: VIMPAT 100 MG  Tablet] 30 tablet 1     Sig: TAKE ONE TABLET BY MOUTH EVERY NIGHT AT BEDTIME DAILY      Last office visit with prescribing clinician: 1/13/2022      Next office visit with prescribing clinician: 1/17/2023            Danelle Calles MA  02/21/22, 15:26 CST

## 2022-03-12 NOTE — TELEPHONE ENCOUNTER
Spoke with patient caregiver patient has medication and will setup a video visit before she will be out of medication    GERD (gastroesophageal reflux disease)

## 2022-03-17 DIAGNOSIS — R56.9 GENERALIZED CONVULSIVE SEIZURES: ICD-10-CM

## 2022-03-17 RX ORDER — DIVALPROEX SODIUM 125 MG/1
CAPSULE, COATED PELLETS ORAL
Qty: 210 CAPSULE | Refills: 1 | Status: SHIPPED | OUTPATIENT
Start: 2022-03-17 | End: 2022-05-13

## 2022-03-17 NOTE — TELEPHONE ENCOUNTER
Rx Refill Note  Requested Prescriptions     Pending Prescriptions Disp Refills   • Divalproex Sodium (DEPAKOTE SPRINKLE) 125 MG capsule [Pharmacy Med Name: DIVALPROEX SODIUM 125 MG  Capsule Delayed Release Sprinkle] 210 capsule 1     Sig: TAKE THREE CAPSULES BY MOUTH EVERY MORNING AND 4 CAPSULES BY MOUTH EVERY NIGHT AT BEDTIME      Last office visit with prescribing clinician: 1/13/2022      Next office visit with prescribing clinician: 1/17/2023    Last refill: 11/18/2021    Alix Rey MA  03/17/22, 13:57 CDT

## 2022-03-23 DIAGNOSIS — G40.909 SEIZURE DISORDER: ICD-10-CM

## 2022-03-23 RX ORDER — LACOSAMIDE 100 MG/1
TABLET ORAL
Qty: 30 TABLET | Refills: 1 | Status: SHIPPED | OUTPATIENT
Start: 2022-03-23 | End: 2022-05-24 | Stop reason: SDUPTHER

## 2022-03-23 RX ORDER — LACOSAMIDE 200 MG/1
TABLET, FILM COATED ORAL
Qty: 60 TABLET | Refills: 0 | Status: SHIPPED | OUTPATIENT
Start: 2022-03-23 | End: 2022-04-15

## 2022-03-23 RX ORDER — LACOSAMIDE 100 MG/1
TABLET, FILM COATED ORAL
Qty: 30 TABLET | Refills: 1 | OUTPATIENT
Start: 2022-03-23

## 2022-03-23 NOTE — TELEPHONE ENCOUNTER
Rx Refill Note  Requested Prescriptions     Pending Prescriptions Disp Refills   • Vimpat 200 MG tablet [Pharmacy Med Name: VIMPAT 200 MG  Tablet] 60 tablet 0     Sig: TAKE ONE TABLET BY MOUTH TWICE A DAY      Last office visit with prescribing clinician:   10/11/2021  Next office visit with prescribing clinician: 1/17/2023    Alix Rey MA  03/23/22, 11:07 CDT

## 2022-04-14 DIAGNOSIS — G40.909 SEIZURE DISORDER: ICD-10-CM

## 2022-04-14 NOTE — TELEPHONE ENCOUNTER
Rx Refill Note  Requested Prescriptions     Pending Prescriptions Disp Refills   • Vimpat 200 MG tablet [Pharmacy Med Name: VIMPAT 200 MG  Tablet] 60 tablet 0     Sig: TAKE ONE TABLET BY MOUTH TWICE A DAY      Last office visit with prescribing clinician: 1/13/2022      Next office visit with prescribing clinician: 1/17/2023    Last refill:3/23/2022    Alix Rey MA  04/14/22, 14:21 CDT

## 2022-04-15 RX ORDER — LACOSAMIDE 200 MG/1
TABLET, FILM COATED ORAL
Qty: 60 TABLET | Refills: 0 | Status: SHIPPED | OUTPATIENT
Start: 2022-04-15 | End: 2022-05-31 | Stop reason: SDUPTHER

## 2022-04-18 DIAGNOSIS — G40.909 SEIZURE DISORDER: ICD-10-CM

## 2022-04-19 RX ORDER — LACOSAMIDE 100 MG/1
TABLET ORAL
Qty: 30 TABLET | Refills: 1 | OUTPATIENT
Start: 2022-04-19

## 2022-04-22 DIAGNOSIS — G40.909 SEIZURE DISORDER: ICD-10-CM

## 2022-04-22 RX ORDER — LACOSAMIDE 100 MG/1
TABLET ORAL
Qty: 30 TABLET | Refills: 1 | OUTPATIENT
Start: 2022-04-22

## 2022-05-09 DIAGNOSIS — E03.9 ACQUIRED HYPOTHYROIDISM: ICD-10-CM

## 2022-05-10 NOTE — TELEPHONE ENCOUNTER
Rx Refill Note  Requested Prescriptions     Pending Prescriptions Disp Refills   • cetirizine (zyrTEC) 10 MG tablet [Pharmacy Med Name: CETIRIZINE HCL 10 MG TABS 10 Tablet] 90 tablet 3     Sig: TAKE ONE TABLET BY MOUTH DAILY      Last office visit with prescribing clinician: 1/13/2022      Next office visit with prescribing clinician: 1/17/2023    Last refill:    Alix Rey MA  05/10/22, 10:57 CDT

## 2022-05-10 NOTE — TELEPHONE ENCOUNTER
Rx Refill Note  Requested Prescriptions     Pending Prescriptions Disp Refills   • levothyroxine (SYNTHROID, LEVOTHROID) 25 MCG tablet [Pharmacy Med Name: LEVOTHYROXINE SODIUM 25 MCG 25 Tablet] 90 tablet 1     Sig: TAKE ONE TABLET BY MOUTH DAILY      Last office visit with prescribing clinician:  1/13/2022  Next office visit with prescribing clinician: 1/17/2023    LRX: 11/12/2021-6 months    Last lab:1/19/2022    Ekaterina Hansen MA  05/10/22, 14:20 CDT

## 2022-05-11 RX ORDER — CETIRIZINE HYDROCHLORIDE 10 MG/1
TABLET ORAL
Qty: 90 TABLET | Refills: 3 | OUTPATIENT
Start: 2022-05-11

## 2022-05-11 RX ORDER — LEVOTHYROXINE SODIUM 0.03 MG/1
TABLET ORAL
Qty: 90 TABLET | Refills: 1 | Status: SHIPPED | OUTPATIENT
Start: 2022-05-11 | End: 2023-02-10

## 2022-05-12 RX ORDER — CETIRIZINE HYDROCHLORIDE 10 MG/1
TABLET ORAL
Qty: 90 TABLET | Refills: 3 | Status: SHIPPED | OUTPATIENT
Start: 2022-05-12

## 2022-05-12 NOTE — TELEPHONE ENCOUNTER
Rx Refill Note  Requested Prescriptions     Pending Prescriptions Disp Refills   • cetirizine (zyrTEC) 10 MG tablet [Pharmacy Med Name: CETIRIZINE HCL 10 MG TABS 10 Tablet] 90 tablet 3     Sig: TAKE ONE TABLET BY MOUTH DAILY      Last office visit with prescribing clinician: 1/13/2022      Next office visit with prescribing clinician: 1/17/2023     Last refill: 5/10/2021     Alix Rey MA  05/12/22, 13:12 CDT

## 2022-05-13 DIAGNOSIS — R56.9 GENERALIZED CONVULSIVE SEIZURES: ICD-10-CM

## 2022-05-13 RX ORDER — DIVALPROEX SODIUM 125 MG/1
CAPSULE, COATED PELLETS ORAL
Qty: 210 CAPSULE | Refills: 1 | Status: SHIPPED | OUTPATIENT
Start: 2022-05-13 | End: 2022-07-14

## 2022-05-13 NOTE — TELEPHONE ENCOUNTER
Rx Refill Note  Requested Prescriptions     Pending Prescriptions Disp Refills   • Divalproex Sodium (DEPAKOTE SPRINKLE) 125 MG capsule [Pharmacy Med Name: DIVALPROEX SODIUM 125 MG  Capsule Delayed Release Sprinkle] 210 capsule 1     Sig: TAKE THREE CAPSULES BY MOUTH EVERY MORNING AND 4 CAPSULES BY MOUTH EVERY NIGHT AT BEDTIME      Last office visit with prescribing clinician: 1/13/2022      Next office visit with prescribing clinician: 1/17/2023    Last refill: 3/17/2022    Alix Rey MA  05/13/22, 08:50 CDT

## 2022-05-24 DIAGNOSIS — G40.909 SEIZURE DISORDER: ICD-10-CM

## 2022-05-24 RX ORDER — LACOSAMIDE 100 MG/1
TABLET ORAL
Qty: 30 TABLET | Refills: 1 | Status: SHIPPED | OUTPATIENT
Start: 2022-05-24 | End: 2022-06-20

## 2022-05-24 NOTE — TELEPHONE ENCOUNTER
Pt's  Elsa HERNANDEZ to request refill for vimpat 100mg. Reports that pt took last pill yesterday and needs dose today.    Rx Refill Note  Requested Prescriptions     Pending Prescriptions Disp Refills   • lacosamide (Vimpat) 100 MG tablet tablet 30 tablet 1     Sig: Take one tablet by mouth every night at bedtime daily.      Last office visit with prescribing clinician: 1/13/2022      Next office visit with prescribing clinician: 1/17/2023     LRX: 3/23/2022        Ekaterina Hansen MA  05/24/22, 16:10 CDT

## 2022-05-31 DIAGNOSIS — G40.909 SEIZURE DISORDER: ICD-10-CM

## 2022-05-31 RX ORDER — LACOSAMIDE 200 MG/1
1 TABLET ORAL 2 TIMES DAILY
Qty: 60 TABLET | Refills: 0 | Status: SHIPPED | OUTPATIENT
Start: 2022-05-31 | End: 2022-06-27

## 2022-05-31 NOTE — TELEPHONE ENCOUNTER
Rx Refill Note  Requested Prescriptions     Pending Prescriptions Disp Refills   • lacosamide (Vimpat) 200 MG tablet 60 tablet 0     Sig: Take 1 tablet by mouth 2 (Two) Times a Day.      Last office visit with prescribing clinician: 1/13/2022      Next office visit with prescribing clinician: 1/17/2023            Lisa Farrar LPN  05/31/22, 09:26 CDT

## 2022-06-14 DIAGNOSIS — G40.909 SEIZURE DISORDER: ICD-10-CM

## 2022-06-14 RX ORDER — FOLIC ACID 1 MG/1
TABLET ORAL
Qty: 90 TABLET | Refills: 1 | Status: SHIPPED | OUTPATIENT
Start: 2022-06-14 | End: 2023-01-26

## 2022-06-14 NOTE — TELEPHONE ENCOUNTER
Rx Refill Note  Requested Prescriptions     Pending Prescriptions Disp Refills   • folic acid (FOLVITE) 1 MG tablet [Pharmacy Med Name: FOLIC ACID 1 MG TABS 1 Tablet] 90 tablet 1     Sig: TAKE ONE TABLET BY MOUTH DAILY      Last office visit with prescribing clinician: 01/13/2022  Next office visit with prescribing clinician: 01/17/2023           Lisa Farrar LPN  06/14/22, 09:09 CDT

## 2022-06-20 DIAGNOSIS — G40.909 SEIZURE DISORDER: ICD-10-CM

## 2022-06-20 RX ORDER — LACOSAMIDE 100 MG/1
TABLET ORAL
Qty: 30 TABLET | Refills: 0 | Status: SHIPPED | OUTPATIENT
Start: 2022-06-20 | End: 2022-08-18

## 2022-06-20 NOTE — TELEPHONE ENCOUNTER
Rx Refill Note  Requested Prescriptions     Pending Prescriptions Disp Refills   • lacosamide (VIMPAT) 100 MG tablet tablet [Pharmacy Med Name: LACOSAMIDE 100 MG TABS 100 Tablet] 30 tablet 1     Sig: Take one tablet by mouth every night at bedtime daily.      Last office visit with prescribing clinician: 1/13/2022      Next office visit with prescribing clinician: 1/17/2023            Lisa Farrar LPN  06/20/22, 09:49 CDT

## 2022-06-27 DIAGNOSIS — G40.909 SEIZURE DISORDER: ICD-10-CM

## 2022-06-27 RX ORDER — LACOSAMIDE 200 MG/1
TABLET, FILM COATED ORAL
Qty: 60 TABLET | Refills: 0 | Status: SHIPPED | OUTPATIENT
Start: 2022-06-27 | End: 2022-07-14

## 2022-06-27 NOTE — TELEPHONE ENCOUNTER
Rx Refill Note  Requested Prescriptions     Pending Prescriptions Disp Refills   • Vimpat 200 MG tablet [Pharmacy Med Name: VIMPAT 200 MG  Tablet] 60 tablet 0     Sig: TAKE ONE TABLET BY MOUTH TWICE A DAY      Last office visit with prescribing clinician: 01/13/2022    Next office visit with prescribing clinician: 01/17/2023           Lisa Farrar LPN  06/27/22, 15:38 CDT

## 2022-07-13 DIAGNOSIS — R56.9 GENERALIZED CONVULSIVE SEIZURES: ICD-10-CM

## 2022-07-13 DIAGNOSIS — G40.909 SEIZURE DISORDER: ICD-10-CM

## 2022-07-13 NOTE — TELEPHONE ENCOUNTER
Rx Refill Note  Requested Prescriptions     Pending Prescriptions Disp Refills   • lacosamide (VIMPAT) 200 MG tablet [Pharmacy Med Name: LACOSAMIDE 200 MG TABS 200 Tablet] 60 tablet 0     Sig: TAKE ONE TABLET BY MOUTH TWICE A DAY   • Divalproex Sodium (DEPAKOTE SPRINKLE) 125 MG capsule [Pharmacy Med Name: DIVALPROEX SODIUM 125 MG  Capsule Delayed Release Sprinkle] 210 capsule 1     Sig: TAKE THREE CAPSULES BY MOUTH EVERY MORNING AND 4 CAPSULES BY MOUTH EVERY NIGHT AT BEDTIME      Last office visit with prescribing clinician: 1/13/2022      Next office visit with prescribing clinician: 1/17/2023       {TIP  Please add Last Relevant Lab Date if appropriate:23}     Danelle Calles MA  07/13/22, 09:14 CDT

## 2022-07-14 RX ORDER — DIVALPROEX SODIUM 125 MG/1
CAPSULE, COATED PELLETS ORAL
Qty: 210 CAPSULE | Refills: 1 | Status: SHIPPED | OUTPATIENT
Start: 2022-07-14 | End: 2022-09-15

## 2022-07-14 RX ORDER — LACOSAMIDE 200 MG/1
TABLET ORAL
Qty: 60 TABLET | Refills: 2 | Status: SHIPPED | OUTPATIENT
Start: 2022-07-14 | End: 2022-10-31

## 2022-08-17 DIAGNOSIS — G40.909 SEIZURE DISORDER: ICD-10-CM

## 2022-08-17 NOTE — TELEPHONE ENCOUNTER
Rx Refill Note  Requested Prescriptions     Pending Prescriptions Disp Refills   • lacosamide (VIMPAT) 100 MG tablet tablet [Pharmacy Med Name: LACOSAMIDE 100 MG TABS 100 Tablet] 30 tablet 0     Sig: TAKE ONE TABLET BY MOUTH NIGHTLY AT BEDTIME.      Last office visit with prescribing clinician: Visit date not found      Next office visit with prescribing clinician: Visit date not found    Last refill:     Alix Rey MA  08/17/22, 10:34 CDT

## 2022-08-17 NOTE — TELEPHONE ENCOUNTER
Rx Refill Note  Requested Prescriptions     Pending Prescriptions Disp Refills   • lacosamide (VIMPAT) 100 MG tablet tablet [Pharmacy Med Name: LACOSAMIDE 100 MG TABS 100 Tablet] 30 tablet 0     Sig: TAKE ONE TABLET BY MOUTH NIGHTLY AT BEDTIME.      Last office visit with prescribing clinician: 01/13/2023  Next office visit with prescribing clinician: 01/17/2023         Lisa Farrar LPN  08/17/22, 11:31 CDT

## 2022-08-18 RX ORDER — LACOSAMIDE 100 MG/1
TABLET ORAL
Qty: 30 TABLET | Refills: 0 | Status: SHIPPED | OUTPATIENT
Start: 2022-08-18 | End: 2022-09-21

## 2022-09-15 DIAGNOSIS — R56.9 GENERALIZED CONVULSIVE SEIZURES: ICD-10-CM

## 2022-09-15 RX ORDER — DIVALPROEX SODIUM 125 MG/1
CAPSULE, COATED PELLETS ORAL
Qty: 210 CAPSULE | Refills: 1 | Status: SHIPPED | OUTPATIENT
Start: 2022-09-15 | End: 2022-12-14

## 2022-09-15 NOTE — TELEPHONE ENCOUNTER
Rx Refill Note  Requested Prescriptions     Pending Prescriptions Disp Refills   • Divalproex Sodium (DEPAKOTE SPRINKLE) 125 MG capsule [Pharmacy Med Name: DIVALPROEX SODIUM 125 MG  Capsule Delayed Release Sprinkle] 210 capsule 1     Sig: TAKE THREE CAPSULES BY MOUTH EVERY MORNING AND 4 CAPSULES BY MOUTH EVERY NIGHT AT BEDTIME      Last office visit with prescribing clinician: 1/13/2022      Next office visit with prescribing clinician: 1/17/2023            Danelle Calles MA  09/15/22, 11:33 CDT

## 2022-09-20 DIAGNOSIS — G40.909 SEIZURE DISORDER: ICD-10-CM

## 2022-09-21 RX ORDER — LACOSAMIDE 100 MG/1
TABLET ORAL
Qty: 90 TABLET | Refills: 0 | Status: SHIPPED | OUTPATIENT
Start: 2022-09-21 | End: 2022-12-07

## 2022-09-28 DIAGNOSIS — G40.909 SEIZURE DISORDER: ICD-10-CM

## 2022-09-28 RX ORDER — LACOSAMIDE 100 MG/1
TABLET ORAL
Qty: 30 TABLET | Refills: 1 | OUTPATIENT
Start: 2022-09-28

## 2022-09-28 RX ORDER — LACOSAMIDE 200 MG/1
TABLET ORAL
Qty: 60 TABLET | Refills: 2 | OUTPATIENT
Start: 2022-09-28

## 2022-09-28 RX ORDER — LACOSAMIDE 200 MG/1
TABLET, FILM COATED ORAL
Qty: 60 TABLET | Refills: 0 | OUTPATIENT
Start: 2022-09-28

## 2022-09-28 NOTE — TELEPHONE ENCOUNTER
Refill is a duplicate     Rx Refill Note  Requested Prescriptions     Pending Prescriptions Disp Refills   • Vimpat 200 MG tablet [Pharmacy Med Name: VIMPAT 200 MG  Tablet] 60 tablet 0     Sig: TAKE ONE TABLET BY MOUTH TWICE A DAY      Last office visit with prescribing clinician: Visit date not found      Next office visit with prescribing clinician: Visit date not found            Danelle Calles MA  09/28/22, 12:05 CDT

## 2022-09-28 NOTE — TELEPHONE ENCOUNTER
Refills too soon     Rx Refill Note  Requested Prescriptions     Pending Prescriptions Disp Refills   • lacosamide (Vimpat) 100 MG tablet tablet [Pharmacy Med Name: VIMPAT 100 MG  Tablet] 30 tablet 1     Sig: TAKE ONE TABLET BY MOUTH EVERY NIGHT AT BEDTIME DAILY   • lacosamide (VIMPAT) 200 MG tablet [Pharmacy Med Name: LACOSAMIDE 200 MG TABS 200 Tablet] 60 tablet 2     Sig: TAKE ONE TABLET BY MOUTH TWICE A DAY      Last office visit with prescribing clinician: 1/13/2022      Next office visit with prescribing clinician: 1/17/2023            Danelle Calles MA  09/28/22, 12:05 CDT

## 2022-10-31 DIAGNOSIS — G40.909 SEIZURE DISORDER: ICD-10-CM

## 2022-10-31 RX ORDER — FOLIC ACID 1 MG/1
TABLET ORAL
Qty: 90 TABLET | Refills: 1 | OUTPATIENT
Start: 2022-10-31

## 2022-10-31 RX ORDER — LACOSAMIDE 200 MG/1
TABLET ORAL
Qty: 60 TABLET | Refills: 5 | Status: SHIPPED | OUTPATIENT
Start: 2022-10-31

## 2022-10-31 NOTE — TELEPHONE ENCOUNTER
Rx Refill Note  Requested Prescriptions     Pending Prescriptions Disp Refills   • lacosamide (VIMPAT) 200 MG tablet [Pharmacy Med Name: LACOSAMIDE 200 MG TABS 200 Tablet] 60 tablet 5     Sig: TAKE ONE TABLET BY MOUTH TWICE A DAY      Last office visit with prescribing clinician: 1/13/2022      Next office visit with prescribing clinician: 1/17/2023            Danelle Calles MA  10/31/22, 11:53 CDT

## 2022-10-31 NOTE — TELEPHONE ENCOUNTER
Refill too soon     Rx Refill Note  Requested Prescriptions     Pending Prescriptions Disp Refills   • folic acid (FOLVITE) 1 MG tablet [Pharmacy Med Name: FOLIC ACID 1 MG TABS 1 Tablet] 90 tablet 1     Sig: TAKE ONE TABLET BY MOUTH DAILY      Last office visit with prescribing clinician: Visit date not found      Next office visit with prescribing clinician: Visit date not found            Danelle Calles MA  10/31/22, 11:53 CDT

## 2022-11-09 DIAGNOSIS — G40.909 SEIZURE DISORDER: ICD-10-CM

## 2022-11-09 RX ORDER — FOLIC ACID 1 MG/1
TABLET ORAL
Qty: 90 TABLET | Refills: 1 | OUTPATIENT
Start: 2022-11-09

## 2022-11-09 NOTE — TELEPHONE ENCOUNTER
Rx Refill Note  Requested Prescriptions     Pending Prescriptions Disp Refills   • folic acid (FOLVITE) 1 MG tablet [Pharmacy Med Name: FOLIC ACID 1 MG TABS 1 Tablet] 90 tablet 1     Sig: TAKE ONE TABLET BY MOUTH DAILY      Last office visit with prescribing clinician: 01/13/2022      Next office visit with prescribing clinician: 01/17/2023            Danelle Calles MA  11/09/22, 15:49 CST

## 2022-11-29 DIAGNOSIS — G40.909 SEIZURE DISORDER: ICD-10-CM

## 2022-11-29 RX ORDER — LACOSAMIDE 100 MG/1
TABLET ORAL
Qty: 30 TABLET | Refills: 5 | OUTPATIENT
Start: 2022-11-29

## 2022-11-29 NOTE — TELEPHONE ENCOUNTER
Refill too soon     Rx Refill Note  Requested Prescriptions     Pending Prescriptions Disp Refills   • lacosamide (VIMPAT) 100 MG tablet tablet [Pharmacy Med Name: LACOSAMIDE 100 MG TABS 100 Tablet] 30 tablet 5     Sig: TAKE ONE TABLET BY MOUTH NIGHTLY AT BEDTIME.      Last office visit with prescribing clinician: 1/13/2022   Last telemedicine visit with prescribing clinician: 1/17/2023   Next office visit with prescribing clinician: 1/17/2023                         Would you like a call back once the refill request has been completed: [] Yes [] No    If the office needs to give you a call back, can they leave a voicemail: [] Yes [] No    Danelle Calles MA  11/29/22, 14:46 CST

## 2022-12-05 DIAGNOSIS — G40.909 SEIZURE DISORDER: ICD-10-CM

## 2022-12-05 NOTE — TELEPHONE ENCOUNTER
Patients mother called and said that she really needs the script called in by the end of the day as Candy is out and needs this medication.

## 2022-12-06 NOTE — TELEPHONE ENCOUNTER
Rx Refill Note  Requested Prescriptions     Pending Prescriptions Disp Refills   • lacosamide (VIMPAT) 100 MG tablet tablet [Pharmacy Med Name: LACOSAMIDE 100 MG TABS 100 Tablet] 30 tablet 0     Sig: TAKE ONE TABLET BY MOUTH NIGHTLY AT BEDTIME.      Last office visit with prescribing clinician: 1/13/2022   Last telemedicine visit with prescribing clinician: 1/17/2023   Next office visit with prescribing clinician: 1/17/2023                         Would you like a call back once the refill request has been completed: [] Yes [] No    If the office needs to give you a call back, can they leave a voicemail: [] Yes [] No    Danelle Calles MA  12/06/22, 16:21 CST

## 2022-12-07 RX ORDER — LACOSAMIDE 100 MG/1
100 TABLET ORAL
Qty: 90 TABLET | Refills: 0 | OUTPATIENT
Start: 2022-12-07

## 2022-12-07 RX ORDER — LACOSAMIDE 100 MG/1
TABLET ORAL
Qty: 30 TABLET | Refills: 0 | Status: SHIPPED | OUTPATIENT
Start: 2022-12-07 | End: 2023-01-16

## 2022-12-14 DIAGNOSIS — R56.9 GENERALIZED CONVULSIVE SEIZURES: ICD-10-CM

## 2022-12-14 RX ORDER — DIVALPROEX SODIUM 125 MG/1
CAPSULE, COATED PELLETS ORAL
Qty: 210 CAPSULE | Refills: 1 | Status: SHIPPED | OUTPATIENT
Start: 2022-12-14 | End: 2023-01-16

## 2022-12-14 NOTE — TELEPHONE ENCOUNTER
Rx Refill Note  Requested Prescriptions     Pending Prescriptions Disp Refills   • Divalproex Sodium (DEPAKOTE SPRINKLE) 125 MG capsule [Pharmacy Med Name: DIVALPROEX SODIUM 125 MG  Capsule Delayed Release Sprinkle] 210 capsule 1     Sig: TAKE THREE CAPSULES BY MOUTH EVERY MORNING AND 4 CAPSULES BY MOUTH EVERY NIGHT AT BEDTIME      Last office visit with prescribing clinician: Visit date not found   Last telemedicine visit with prescribing clinician: 1/17/2023   Next office visit with prescribing clinician: Visit date not found     Alix Rey MA  12/14/22, 09:15 CST

## 2023-01-16 DIAGNOSIS — R56.9 GENERALIZED CONVULSIVE SEIZURES: ICD-10-CM

## 2023-01-16 DIAGNOSIS — G40.909 SEIZURE DISORDER: ICD-10-CM

## 2023-01-16 RX ORDER — DIVALPROEX SODIUM 125 MG/1
CAPSULE, COATED PELLETS ORAL
Qty: 210 CAPSULE | Refills: 1 | Status: SHIPPED | OUTPATIENT
Start: 2023-01-16 | End: 2023-03-09

## 2023-01-16 RX ORDER — LACOSAMIDE 100 MG/1
TABLET ORAL
Qty: 30 TABLET | Refills: 5 | Status: SHIPPED | OUTPATIENT
Start: 2023-01-16

## 2023-01-16 NOTE — TELEPHONE ENCOUNTER
Rx Refill Note  Requested Prescriptions     Pending Prescriptions Disp Refills   • lacosamide (VIMPAT) 100 MG tablet tablet [Pharmacy Med Name: LACOSAMIDE 100 MG TABS 100 Tablet] 30 tablet 5     Sig: TAKE ONE TABLET BY MOUTH NIGHTLY AT BEDTIME.   • Divalproex Sodium (DEPAKOTE SPRINKLE) 125 MG capsule [Pharmacy Med Name: DIVALPROEX SODIUM 125 MG  Capsule Delayed Release Sprinkle] 210 capsule 1     Sig: TAKE THREE CAPSULES BY MOUTH EVERY MORNING AND 4 CAPSULES BY MOUTH EVERY NIGHT AT BEDTIME      Last office visit with prescribing clinician: 01/13/2022   Next office visit with prescribing clinician: 01/17/2023                         Would you like a call back once the refill request has been completed: [] Yes [] No    If the office needs to give you a call back, can they leave a voicemail: [] Yes [] No    Danelle Calles MA  01/16/23, 12:55 CST

## 2023-01-17 ENCOUNTER — OFFICE VISIT (OUTPATIENT)
Dept: FAMILY MEDICINE CLINIC | Facility: CLINIC | Age: 48
End: 2023-01-17
Payer: MEDICAID

## 2023-01-17 VITALS
BODY MASS INDEX: 18 KG/M2 | TEMPERATURE: 98.6 F | DIASTOLIC BLOOD PRESSURE: 91 MMHG | RESPIRATION RATE: 16 BRPM | SYSTOLIC BLOOD PRESSURE: 131 MMHG | WEIGHT: 101.6 LBS

## 2023-01-17 DIAGNOSIS — F81.89 DEVELOPMENTAL NON-VERBAL DISORDER: ICD-10-CM

## 2023-01-17 DIAGNOSIS — E55.9 VITAMIN D DEFICIENCY: ICD-10-CM

## 2023-01-17 DIAGNOSIS — Z13.0 SCREENING FOR DEFICIENCY ANEMIA: ICD-10-CM

## 2023-01-17 DIAGNOSIS — Z13.220 LIPID SCREENING: ICD-10-CM

## 2023-01-17 DIAGNOSIS — Z00.01 ENCOUNTER FOR WELL ADULT EXAM WITH ABNORMAL FINDINGS: Primary | ICD-10-CM

## 2023-01-17 DIAGNOSIS — R56.9 GENERALIZED CONVULSIVE SEIZURES: ICD-10-CM

## 2023-01-17 DIAGNOSIS — Z13.1 DIABETES MELLITUS SCREENING: ICD-10-CM

## 2023-01-17 DIAGNOSIS — E03.9 ACQUIRED HYPOTHYROIDISM: ICD-10-CM

## 2023-01-17 PROCEDURE — 99396 PREV VISIT EST AGE 40-64: CPT | Performed by: FAMILY MEDICINE

## 2023-01-17 NOTE — PROGRESS NOTES
Subjective cc: adult well exam   Nely Abbasi is a 47 y.o. female.     History of Present Illness     The patient is present today for annual physical. The patient is accompanied by an adult female who contributes to her history.    The adult female states the patient is eating, drinking, and moving her bowels normally. The adult female denies any new problems or concerns. The adult female confirms the patient is still taking Vimpat and Depakote for her seizures. She denies any recent seizures.    The adult female declines the patient receiving the influenza vaccine today.    The following portions of the patient's history were reviewed and updated as appropriate: allergies, current medications, past family history, past medical history, past social history, past surgical history and problem list.        Review of Systems    Objective   Blood pressure 131/91, temperature 98.6 °F (37 °C), temperature source Infrared, resp. rate 16, weight 46.1 kg (101 lb 9.6 oz), not currently breastfeeding.  Physical Exam  Vitals and nursing note reviewed.   Constitutional:       General: She is not in acute distress.     Comments: Non-verbal, NAD, regards caregiver, thin   HENT:      Right Ear: External ear normal.      Left Ear: External ear normal.      Nose: Nose normal.   Eyes:      General:         Right eye: No discharge.         Left eye: No discharge.      Conjunctiva/sclera: Conjunctivae normal.   Cardiovascular:      Rate and Rhythm: Normal rate and regular rhythm.      Pulses: Normal pulses.   Pulmonary:      Effort: Pulmonary effort is normal. No respiratory distress.   Musculoskeletal:      Cervical back: Normal range of motion. No rigidity or tenderness.   Lymphadenopathy:      Cervical: No cervical adenopathy.   Skin:     General: Skin is warm and dry.      Coloration: Skin is pale.   Neurological:      Mental Status: She is alert. Mental status is at baseline.      Cranial Nerves: Cranial nerve deficit present.       Motor: Weakness present.      Coordination: Coordination abnormal.      Gait: Gait abnormal.      Deep Tendon Reflexes: Reflexes abnormal.         Blood pressure 131/91 mmHg.  LDL was slightly elevated at 111.    Assessment & Plan   Problems Addressed this Visit        Endocrine and Metabolic    Hypothyroidism    Relevant Orders    T4, free    TSH       Neuro    Generalized convulsive seizures (HCC) - Primary    Relevant Orders    Valproic Acid Level, Total    Developmental non-verbal disorder   Other Visit Diagnoses     Encounter for well adult exam with abnormal findings        Lipid screening        Relevant Orders    Lipid panel    Screening for deficiency anemia        Relevant Orders    CBC No Differential    Diabetes mellitus screening        Relevant Orders    Comprehensive metabolic panel    Vitamin D deficiency        Relevant Orders    Vitamin D 25 hydroxy      Diagnoses       Codes Comments    Generalized convulsive seizures (HCC)    -  Primary ICD-10-CM: R56.9  ICD-9-CM: 780.39     Developmental non-verbal disorder     ICD-10-CM: F81.89  ICD-9-CM: 315.2     Acquired hypothyroidism     ICD-10-CM: E03.9  ICD-9-CM: 244.9     Encounter for well adult exam with abnormal findings     ICD-10-CM: Z00.01  ICD-9-CM: V70.0     Lipid screening     ICD-10-CM: Z13.220  ICD-9-CM: V77.91     Screening for deficiency anemia     ICD-10-CM: Z13.0  ICD-9-CM: V78.1     Diabetes mellitus screening     ICD-10-CM: Z13.1  ICD-9-CM: V77.1     Vitamin D deficiency     ICD-10-CM: E55.9  ICD-9-CM: 268.9         1. Adult well exam  - We will return for fasting labs. Vital signs within normal range. Weight within normal range. Patient is well cared for. Declines immunizations and screenings at this time. Return in 1 year for reevaluation. Advised on diet and lifestyle changes.    2. Hypothyroidism  - Chronic, stable. We will repeat TSH and free T4 for further evaluation and adjust dose of medication as needed.    3. Seizure  disorder  - Chronic, stable. Continue on current medication. We will repeat a Depakote level and adjust dose as needed.    4. Developmental nonverbal disorder  - Chronic, stable. No change.    Transcribed from ambient dictation for Kylie Higuera MD by Batsheva Clemente, Quality .  01/17/23   15:16 CST    Patient or patient representative verbalized consent to the visit recording.  I have personally performed the services described in this document as transcribed by the above individual, and it is both accurate and complete.          This document has been electronically signed by Kylie Higuera MD on January 27, 2023 16:47 CST

## 2023-01-21 LAB
25(OH)D3+25(OH)D2 SERPL-MCNC: 16.1 NG/ML (ref 30–100)
ALBUMIN SERPL-MCNC: 4.4 G/DL (ref 3.8–4.8)
ALBUMIN/GLOB SERPL: 1.5 {RATIO} (ref 1.2–2.2)
ALP SERPL-CCNC: 56 IU/L (ref 44–121)
ALT SERPL-CCNC: 10 IU/L (ref 0–32)
AST SERPL-CCNC: 15 IU/L (ref 0–40)
BILIRUB SERPL-MCNC: <0.2 MG/DL (ref 0–1.2)
BUN SERPL-MCNC: 22 MG/DL (ref 6–24)
BUN/CREAT SERPL: 40 (ref 9–23)
CALCIUM SERPL-MCNC: 9.2 MG/DL (ref 8.7–10.2)
CHLORIDE SERPL-SCNC: 103 MMOL/L (ref 96–106)
CHOLEST SERPL-MCNC: 165 MG/DL (ref 100–199)
CO2 SERPL-SCNC: 23 MMOL/L (ref 20–29)
CREAT SERPL-MCNC: 0.55 MG/DL (ref 0.57–1)
EGFRCR SERPLBLD CKD-EPI 2021: 114 ML/MIN/1.73
ERYTHROCYTE [DISTWIDTH] IN BLOOD BY AUTOMATED COUNT: 13.9 % (ref 11.7–15.4)
GLOBULIN SER CALC-MCNC: 2.9 G/DL (ref 1.5–4.5)
GLUCOSE SERPL-MCNC: 80 MG/DL (ref 70–99)
HCT VFR BLD AUTO: 40 % (ref 34–46.6)
HDLC SERPL-MCNC: 50 MG/DL
HGB BLD-MCNC: 13 G/DL (ref 11.1–15.9)
LDLC SERPL CALC-MCNC: 97 MG/DL (ref 0–99)
MCH RBC QN AUTO: 28.6 PG (ref 26.6–33)
MCHC RBC AUTO-ENTMCNC: 32.5 G/DL (ref 31.5–35.7)
MCV RBC AUTO: 88 FL (ref 79–97)
PLATELET # BLD AUTO: 264 X10E3/UL (ref 150–450)
POTASSIUM SERPL-SCNC: 4.6 MMOL/L (ref 3.5–5.2)
PROT SERPL-MCNC: 7.3 G/DL (ref 6–8.5)
RBC # BLD AUTO: 4.54 X10E6/UL (ref 3.77–5.28)
SODIUM SERPL-SCNC: 139 MMOL/L (ref 134–144)
T4 FREE SERPL-MCNC: 1.07 NG/DL (ref 0.82–1.77)
TRIGL SERPL-MCNC: 97 MG/DL (ref 0–149)
TSH SERPL DL<=0.005 MIU/L-ACNC: 2.58 UIU/ML (ref 0.45–4.5)
VALPROATE SERPL-MCNC: 91 UG/ML (ref 50–100)
VLDLC SERPL CALC-MCNC: 18 MG/DL (ref 5–40)
WBC # BLD AUTO: 5.2 X10E3/UL (ref 3.4–10.8)

## 2023-01-26 DIAGNOSIS — G40.909 SEIZURE DISORDER: ICD-10-CM

## 2023-01-26 RX ORDER — FOLIC ACID 1 MG/1
TABLET ORAL
Qty: 90 TABLET | Refills: 5 | Status: SHIPPED | OUTPATIENT
Start: 2023-01-26

## 2023-01-26 NOTE — TELEPHONE ENCOUNTER
Rx Refill Note  Requested Prescriptions     Pending Prescriptions Disp Refills   • folic acid (FOLVITE) 1 MG tablet [Pharmacy Med Name: FOLIC ACID 1 MG TABS 1 Tablet] 90 tablet 5     Sig: TAKE ONE TABLET BY MOUTH DAILY      Last office visit with prescribing clinician: 1/17/2023  Next office visit with prescribing clinician: 1/23/2024      Alix Rey MA  01/26/23, 10:52 CST

## 2023-01-27 RX ORDER — ERGOCALCIFEROL 1.25 MG/1
50000 CAPSULE ORAL WEEKLY
Qty: 5 CAPSULE | Refills: 5 | Status: SHIPPED | OUTPATIENT
Start: 2023-01-27

## 2023-01-27 NOTE — PROGRESS NOTES
Pt's  was informed of lab results would like the Vitamin D supplements be sent in to [facility].

## 2023-02-09 DIAGNOSIS — E03.9 ACQUIRED HYPOTHYROIDISM: ICD-10-CM

## 2023-02-09 RX ORDER — CETIRIZINE HYDROCHLORIDE 10 MG/1
TABLET ORAL
Qty: 90 TABLET | Refills: 3 | OUTPATIENT
Start: 2023-02-09

## 2023-02-09 NOTE — TELEPHONE ENCOUNTER
Rx Refill Note  Requested Prescriptions     Pending Prescriptions Disp Refills   • levothyroxine (SYNTHROID, LEVOTHROID) 25 MCG tablet [Pharmacy Med Name: LEVOTHYROXINE SODIUM 25 MCG 25 Tablet] 90 tablet 1     Sig: TAKE ONE TABLET BY MOUTH DAILY.      Last office visit with prescribing clinician: 01/17/2023   Next office visit with prescribing clinician: 01/23/2024                         Would you like a call back once the refill request has been completed: [] Yes [] No    If the office needs to give you a call back, can they leave a voicemail: [] Yes [] No    Danelle Calles MA  02/09/23, 12:10 CST

## 2023-02-09 NOTE — TELEPHONE ENCOUNTER
Refill too soon     Rx Refill Note  Requested Prescriptions     Pending Prescriptions Disp Refills   • cetirizine (zyrTEC) 10 MG tablet [Pharmacy Med Name: CETIRIZINE HCL 10 MG TAB 10 Tablet] 90 tablet 3     Sig: TAKE ONE TABLET BY MOUTH DAILY      Last office visit with prescribing clinician: Visit date not found   Last telemedicine visit with prescribing clinician: 2/9/2023   Next office visit with prescribing clinician: Visit date not found                         Would you like a call back once the refill request has been completed: [] Yes [] No    If the office needs to give you a call back, can they leave a voicemail: [] Yes [] No    Danelle Calles MA  02/09/23, 12:10 CST

## 2023-02-10 RX ORDER — LEVOTHYROXINE SODIUM 0.03 MG/1
TABLET ORAL
Qty: 90 TABLET | Refills: 1 | Status: SHIPPED | OUTPATIENT
Start: 2023-02-10

## 2023-03-09 DIAGNOSIS — R56.9 GENERALIZED CONVULSIVE SEIZURES: ICD-10-CM

## 2023-03-09 DIAGNOSIS — G40.909 SEIZURE DISORDER: ICD-10-CM

## 2023-03-09 RX ORDER — DIVALPROEX SODIUM 125 MG/1
CAPSULE, COATED PELLETS ORAL
Qty: 210 CAPSULE | Refills: 1 | Status: SHIPPED | OUTPATIENT
Start: 2023-03-09

## 2023-03-09 RX ORDER — LACOSAMIDE 200 MG/1
TABLET ORAL
Qty: 60 TABLET | Refills: 5 | OUTPATIENT
Start: 2023-03-09

## 2023-03-09 NOTE — TELEPHONE ENCOUNTER
Rx Refill Note  Requested Prescriptions     Pending Prescriptions Disp Refills   • lacosamide (VIMPAT) 200 MG tablet [Pharmacy Med Name: LACOSAMIDE 200 MG TABS 200 Tablet] 60 tablet 5     Sig: TAKE ONE TABLET BY MOUTH TWICE A DAY   • Divalproex Sodium (DEPAKOTE SPRINKLE) 125 MG capsule [Pharmacy Med Name: DIVALPROEX SODIUM 125 MG  Capsule Delayed Release Sprinkle] 210 capsule 1     Sig: TAKE THREE CAPSULES BY MOUTH EVERY MORNING AND 4 CAPSULES BY MOUTH EVERY NIGHT AT BEDTIME      Last office visit with prescribing clinician: 1/17/2023   Next office visit with prescribing clinician: 1/23/2024     Alix Rey MA  03/09/23, 11:20 CST

## 2023-05-02 DIAGNOSIS — G40.909 SEIZURE DISORDER: ICD-10-CM

## 2023-05-02 NOTE — TELEPHONE ENCOUNTER
Rx Refill Note  Requested Prescriptions     Pending Prescriptions Disp Refills   • lacosamide (VIMPAT) 200 MG tablet [Pharmacy Med Name: LACOSAMIDE 200 MG TABS 200 Tablet] 60 tablet 5     Sig: TAKE ONE TABLET BY MOUTH TWICE A DAY      Last office visit with prescribing clinician: 1/17/2023    Next office visit with prescribing clinician: 1/23/2024                         Would you like a call back once the refill request has been completed: [] Yes [] No    If the office needs to give you a call back, can they leave a voicemail: [] Yes [] No    Danelle Calles MA  05/02/23, 12:33 CDT

## 2023-05-04 RX ORDER — LACOSAMIDE 200 MG/1
TABLET ORAL
Qty: 60 TABLET | Refills: 5 | Status: SHIPPED | OUTPATIENT
Start: 2023-05-04

## 2023-05-08 RX ORDER — CETIRIZINE HYDROCHLORIDE 10 MG/1
TABLET ORAL
Qty: 90 TABLET | Refills: 3 | Status: SHIPPED | OUTPATIENT
Start: 2023-05-08

## 2023-05-08 NOTE — TELEPHONE ENCOUNTER
Rx Refill Note  Requested Prescriptions     Pending Prescriptions Disp Refills   • cetirizine (zyrTEC) 10 MG tablet [Pharmacy Med Name: CETIRIZINE HCL 10 MG TAB 10 Tablet] 90 tablet 3     Sig: TAKE ONE TABLET BY MOUTH DAILY      Last office visit with prescribing clinician: 1/17/2023   Next office visit with prescribing clinician: 1/23/2024                         Would you like a call back once the refill request has been completed: [] Yes [] No    If the office needs to give you a call back, can they leave a voicemail: [] Yes [] No    Danelle Calles MA  05/08/23, 12:17 CDT

## 2023-05-12 NOTE — TELEPHONE ENCOUNTER
Duplicate request     Spironolactone Pregnancy And Lactation Text: This medication can cause feminization of the male fetus and should be avoided during pregnancy. The active metabolite is also found in breast milk.

## 2023-05-15 DIAGNOSIS — E03.9 ACQUIRED HYPOTHYROIDISM: ICD-10-CM

## 2023-05-15 DIAGNOSIS — R56.9 GENERALIZED CONVULSIVE SEIZURES: ICD-10-CM

## 2023-05-15 RX ORDER — DIVALPROEX SODIUM 125 MG/1
CAPSULE, COATED PELLETS ORAL
Qty: 210 CAPSULE | Refills: 1 | Status: SHIPPED | OUTPATIENT
Start: 2023-05-15

## 2023-05-15 RX ORDER — LEVOTHYROXINE SODIUM 0.03 MG/1
TABLET ORAL
Qty: 90 TABLET | Refills: 1 | OUTPATIENT
Start: 2023-05-15

## 2023-05-15 NOTE — TELEPHONE ENCOUNTER
Rx Refill Note  Requested Prescriptions     Pending Prescriptions Disp Refills   • Divalproex Sodium (DEPAKOTE SPRINKLE) 125 MG capsule [Pharmacy Med Name: DIVALPROEX SODIUM 125 MG  Capsule Delayed Release Sprinkle] 210 capsule 1     Sig: TAKE THREE CAPSULES BY MOUTH EVERY MORNING AND 4 CAPSULES BY MOUTH EVERY NIGHT AT BEDTIME     Refused Prescriptions Disp Refills   • levothyroxine (SYNTHROID, LEVOTHROID) 25 MCG tablet [Pharmacy Med Name: LEVOTHYROXINE SODIUM 25 MCG 25 Tablet] 90 tablet 1     Sig: TAKE ONE TABLET BY MOUTH DAILY.      Last office visit with prescribing clinician: 1/17/2023   Next office visit with prescribing clinician: 1/23/2024       Alix Rey MA  05/15/23, 10:23 CDT

## 2023-06-14 DIAGNOSIS — G40.909 SEIZURE DISORDER: ICD-10-CM

## 2023-06-14 RX ORDER — LACOSAMIDE 100 MG/1
TABLET ORAL
Qty: 30 TABLET | Refills: 5 | OUTPATIENT
Start: 2023-06-14

## 2023-06-14 NOTE — TELEPHONE ENCOUNTER
Too early to refill.     Rx Refill Note  Requested Prescriptions     Pending Prescriptions Disp Refills    lacosamide (VIMPAT) 100 MG tablet tablet [Pharmacy Med Name: LACOSAMIDE 100 MG TABS 100 Tablet] 30 tablet 5     Sig: TAKE ONE TABLET BY MOUTH NIGHTLY AT BEDTIME.      Last office visit with prescribing clinician: 1/17/2023   Last telemedicine visit with prescribing clinician: Visit date not found   Next office visit with prescribing clinician: 1/23/2024                         Would you like a call back once the refill request has been completed: [] Yes [] No    If the office needs to give you a call back, can they leave a voicemail: [] Yes [] No    Lisa Farrar LPN  06/14/23, 13:46 CDT

## 2023-07-26 RX ORDER — ERGOCALCIFEROL 1.25 MG/1
50000 CAPSULE ORAL WEEKLY
Qty: 5 CAPSULE | Refills: 5 | Status: SHIPPED | OUTPATIENT
Start: 2023-07-26

## 2023-08-10 DIAGNOSIS — G40.909 SEIZURE DISORDER: ICD-10-CM

## 2023-08-10 NOTE — TELEPHONE ENCOUNTER
Rx Refill Note  Requested Prescriptions     Pending Prescriptions Disp Refills    lacosamide (VIMPAT) 100 MG tablet tablet [Pharmacy Med Name: LACOSAMIDE 100 MG TABS 100 Tablet] 30 tablet 0     Sig: TAKE ONE TABLET BY MOUTH NIGHTLY AT BEDTIME.      Last office visit with prescribing clinician: 1/17/2023   Next office visit with prescribing clinician: 1/23/2024     Last RF: 7/14/2023      Alix Rey CMA  08/10/23, 12:54 CDT

## 2023-08-11 RX ORDER — LACOSAMIDE 100 MG/1
TABLET ORAL
Qty: 30 TABLET | Refills: 0 | Status: SHIPPED | OUTPATIENT
Start: 2023-08-11

## 2023-08-16 DIAGNOSIS — R56.9 GENERALIZED CONVULSIVE SEIZURES: ICD-10-CM

## 2023-08-17 DIAGNOSIS — E03.9 ACQUIRED HYPOTHYROIDISM: ICD-10-CM

## 2023-08-17 RX ORDER — LEVOTHYROXINE SODIUM 0.03 MG/1
TABLET ORAL
Qty: 90 TABLET | Refills: 1 | Status: SHIPPED | OUTPATIENT
Start: 2023-08-17

## 2023-08-17 NOTE — TELEPHONE ENCOUNTER
Rx Refill Note  Requested Prescriptions     Pending Prescriptions Disp Refills    Divalproex Sodium (DEPAKOTE SPRINKLE) 125 MG capsule [Pharmacy Med Name: DIVALPROEX SODIUM 125 MG  Capsule Delayed Release Sprinkle] 210 capsule      Sig: TAKE THREE CAPSULES BY MOUTH EVERY MORNING AND FOUR CAPSULES BY MOUTH EVERY NIGHT AT BEDTIME      Last office visit with prescribing clinician: 1/17/2023   Next office visit with prescribing clinician: 8/17/2023     Alix Rey CMA  08/17/23, 09:22 CDT

## 2023-08-18 RX ORDER — DIVALPROEX SODIUM 125 MG/1
CAPSULE, COATED PELLETS ORAL
Qty: 210 CAPSULE | Refills: 5 | Status: SHIPPED | OUTPATIENT
Start: 2023-08-18

## 2023-08-31 DIAGNOSIS — G40.909 SEIZURE DISORDER: ICD-10-CM

## 2023-08-31 NOTE — TELEPHONE ENCOUNTER
Rx Refill Note  Requested Prescriptions     Pending Prescriptions Disp Refills    lacosamide (VIMPAT) 100 MG tablet tablet [Pharmacy Med Name: LACOSAMIDE 100 MG TABS 100 Tablet] 30 tablet 5     Sig: TAKE ONE TABLET BY MOUTH NIGHTLY AT BEDTIME.      Last office visit with prescribing clinician: 1/17/2023   Next office visit with prescribing clinician: 1/23/2024    Last refill: 8/11/2023    Alix Rey CMA  08/31/23, 11:20 CDT

## 2023-09-06 RX ORDER — LACOSAMIDE 100 MG/1
TABLET ORAL
Qty: 30 TABLET | Refills: 5 | OUTPATIENT
Start: 2023-09-06

## 2023-09-07 DIAGNOSIS — G40.909 SEIZURE DISORDER: ICD-10-CM

## 2023-09-08 DIAGNOSIS — G40.909 SEIZURE DISORDER: ICD-10-CM

## 2023-09-08 RX ORDER — LACOSAMIDE 100 MG/1
TABLET ORAL
Qty: 30 TABLET | Refills: 1 | OUTPATIENT
Start: 2023-09-08

## 2023-09-08 RX ORDER — LACOSAMIDE 100 MG/1
100 TABLET ORAL
Qty: 30 TABLET | Refills: 0 | OUTPATIENT
Start: 2023-09-08

## 2023-09-08 RX ORDER — LACOSAMIDE 100 MG/1
TABLET ORAL
Qty: 30 TABLET | Refills: 5 | Status: SHIPPED | OUTPATIENT
Start: 2023-09-08

## 2023-09-08 NOTE — TELEPHONE ENCOUNTER
Lindsey called to report that pt will not have enough med to get through the weekend- please advise on status of refill

## 2023-09-08 NOTE — TELEPHONE ENCOUNTER
Refill is a duplicate     Rx Refill Note  Requested Prescriptions     Pending Prescriptions Disp Refills    lacosamide (VIMPAT) 100 MG tablet tablet 30 tablet 0     Sig: Take 1 tablet by mouth every night at bedtime.      Last office visit with prescribing clinician: 1/17/2023   Last telemedicine visit with prescribing clinician: Visit date not found   Next office visit with prescribing clinician: 9/8/2023                         Would you like a call back once the refill request has been completed: [] Yes [] No    If the office needs to give you a call back, can they leave a voicemail: [] Yes [] No    Danelle Calles MA  09/08/23, 16:00 CDT

## 2023-09-08 NOTE — TELEPHONE ENCOUNTER
Caller: MAGDIEL BENJIE    Relationship: Caregiver (non-relative)    Best call back number: [phone]    Requested Prescriptions:   Requested Prescriptions     Pending Prescriptions Disp Refills    lacosamide (VIMPAT) 100 MG tablet tablet 30 tablet 0     Sig: Take 1 tablet by mouth every night at bedtime.        Pharmacy where request should be sent:      Last office visit with prescribing clinician: 1/17/2023   Last telemedicine visit with prescribing clinician: Visit date not found   Next office visit with prescribing clinician: 9/8/2023     Additional details provided by patient: PLEASE ALSO FAX THIS [phone]    Does the patient have less than a 3 day supply:  [] Yes  [] No    Would you like a call back once the refill request has been completed: [] Yes [] No    If the office needs to give you a call back, can they leave a voicemail: [] Yes [] No    Ry Maldonado   09/08/23 12:28 CDT

## 2023-09-08 NOTE — TELEPHONE ENCOUNTER
Refill is a duplicate    Rx Refill Note  Requested Prescriptions     Pending Prescriptions Disp Refills    lacosamide (Vimpat) 100 MG tablet tablet [Pharmacy Med Name: VIMPAT 100 MG  Tablet] 30 tablet 1     Sig: TAKE ONE TABLET BY MOUTH EVERY NIGHT AT BEDTIME DAILY      Last office visit with prescribing clinician: 1/17/2023   Next office visit with prescribing clinician: 9/8/2023                         Would you like a call back once the refill request has been completed: [] Yes [] No    If the office needs to give you a call back, can they leave a voicemail: [] Yes [] No    Danelle Calles MA  09/08/23, 16:00 CDT

## 2023-09-27 DIAGNOSIS — G40.909 SEIZURE DISORDER: ICD-10-CM

## 2023-09-28 RX ORDER — LACOSAMIDE 200 MG/1
TABLET ORAL
Qty: 60 TABLET | Refills: 5 | OUTPATIENT
Start: 2023-09-28

## 2023-10-30 DIAGNOSIS — G40.909 SEIZURE DISORDER: Primary | ICD-10-CM

## 2023-10-30 NOTE — TELEPHONE ENCOUNTER
Rx Refill Note  Requested Prescriptions     Pending Prescriptions Disp Refills    lacosamide (VIMPAT) 200 MG tablet [Pharmacy Med Name: LACOSAMIDE 200 MG TABS 200 Tablet] 60 tablet 5     Sig: TAKE ONE TABLET BY MOUTH TWICE A DAY          Zahra Menon MA  10/30/23, 09:07 CDT

## 2023-11-01 RX ORDER — LACOSAMIDE 200 MG/1
TABLET ORAL
Qty: 60 TABLET | Refills: 5 | Status: SHIPPED | OUTPATIENT
Start: 2023-11-01

## 2023-11-20 DIAGNOSIS — E03.9 ACQUIRED HYPOTHYROIDISM: ICD-10-CM

## 2023-11-20 NOTE — TELEPHONE ENCOUNTER
Rx Refill Note  Requested Prescriptions     Pending Prescriptions Disp Refills    levothyroxine (SYNTHROID, LEVOTHROID) 25 MCG tablet [Pharmacy Med Name: LEVOTHYROXINE SODIUM 25 MCG 25 Tablet] 90 tablet 1     Sig: TAKE ONE TABLET BY MOUTH DAILY.      Last office visit with prescribing clinician: 1/17/2023   Last telemedicine visit with prescribing clinician: Visit date not found   Next office visit with prescribing clinician: 1/23/2024                         Would you like a call back once the refill request has been completed: [] Yes [] No    If the office needs to give you a call back, can they leave a voicemail: [] Yes [] No    Sue Trujillo, PCT  11/20/23, 11:09 CST

## 2023-11-22 RX ORDER — LEVOTHYROXINE SODIUM 0.03 MG/1
TABLET ORAL
Qty: 90 TABLET | Refills: 1 | Status: SHIPPED | OUTPATIENT
Start: 2023-11-22

## 2024-01-24 ENCOUNTER — OFFICE VISIT (OUTPATIENT)
Dept: FAMILY MEDICINE CLINIC | Facility: CLINIC | Age: 49
End: 2024-01-24
Payer: MEDICAID

## 2024-01-24 VITALS
WEIGHT: 105 LBS | HEART RATE: 83 BPM | RESPIRATION RATE: 20 BRPM | HEIGHT: 65 IN | TEMPERATURE: 98.5 F | DIASTOLIC BLOOD PRESSURE: 89 MMHG | OXYGEN SATURATION: 90 % | BODY MASS INDEX: 17.49 KG/M2 | SYSTOLIC BLOOD PRESSURE: 132 MMHG

## 2024-01-24 DIAGNOSIS — E03.9 HYPOTHYROIDISM, UNSPECIFIED TYPE: Primary | ICD-10-CM

## 2024-01-24 DIAGNOSIS — Z51.81 THERAPEUTIC DRUG MONITORING: ICD-10-CM

## 2024-01-24 DIAGNOSIS — Z13.220 SCREENING FOR LIPID DISORDERS: ICD-10-CM

## 2024-01-24 DIAGNOSIS — Z13.0 SCREENING FOR DEFICIENCY ANEMIA: ICD-10-CM

## 2024-01-24 DIAGNOSIS — E55.9 VITAMIN D DEFICIENCY: ICD-10-CM

## 2024-01-24 NOTE — PROGRESS NOTES
"Chief Complaint  Annual Exam (Pt is here for a physical. )    Subjective        Nely Abbasi presents to [facility]  History of Present Illness  Mrs. Abbasi presents today for a physical.      She has developmental non-verbal disorder.  She has seizure disorder.  She has hypothyroidism.  These conditions are stable on her current medications.    New concerns include:      Diet:  She eats any and everything.      They decline screenings and vaccines    Objective   Vital Signs:  /89 (BP Location: Left arm, Patient Position: Sitting, Cuff Size: Adult)   Pulse 83   Temp 98.5 °F (36.9 °C)   Resp 20   Ht 165.1 cm (65\")   Wt 47.6 kg (105 lb)   SpO2 90%   BMI 17.47 kg/m²   Estimated body mass index is 17.47 kg/m² as calculated from the following:    Height as of this encounter: 165.1 cm (65\").    Weight as of this encounter: 47.6 kg (105 lb).       BMI is below normal parameters (malnutrition). Recommendations: Nutritional counseling/handout provided      Physical Exam  Vitals reviewed.   Constitutional:       General: She is not in acute distress.     Appearance: She is not toxic-appearing.   HENT:      Head: Normocephalic and atraumatic.      Mouth/Throat:      Mouth: Mucous membranes are moist.      Pharynx: Oropharynx is clear.   Eyes:      Extraocular Movements: Extraocular movements intact.      Conjunctiva/sclera: Conjunctivae normal.      Pupils: Pupils are equal, round, and reactive to light.   Cardiovascular:      Rate and Rhythm: Normal rate and regular rhythm.      Pulses: Normal pulses.      Heart sounds: No murmur heard.  Pulmonary:      Effort: Pulmonary effort is normal. No respiratory distress.      Breath sounds: Normal breath sounds. No wheezing or rhonchi.   Abdominal:      General: Bowel sounds are normal. There is no distension.      Palpations: Abdomen is soft.      Tenderness: There is no abdominal tenderness.   Musculoskeletal:         General: No " swelling or tenderness. Normal range of motion.      Cervical back: Normal range of motion and neck supple. No muscular tenderness.   Skin:     General: Skin is warm and dry.      Findings: No erythema or rash.   Neurological:      General: No focal deficit present.      Mental Status: She is alert and oriented to person, place, and time.      Cranial Nerves: No cranial nerve deficit.      Motor: No weakness.   Psychiatric:         Mood and Affect: Mood normal.         Behavior: Behavior normal.          Result Review :               Assessment and Plan     Diagnoses and all orders for this visit:    1. Hypothyroidism, unspecified type (Primary)  -     TSH    2. Screening for lipid disorders  -     Lipid Panel    3. Vitamin D deficiency  -     Vitamin D 25 hydroxy    4. Therapeutic drug monitoring  -     CBC (No Diff)  -     Comprehensive metabolic panel    5. Screening for deficiency anemia  -     CBC (No Diff)    Her chaperone says the guardian declines cancer screenings and vaccines  I asked if we could order blood work today, she will check with the guardian  Counseled on diet  Counseled on healthy lifestyle modifications         Follow Up     No follow-ups on file.  Patient was given instructions and counseling regarding her condition or for health maintenance advice. Please see specific information pulled into the AVS if appropriate.

## 2024-01-25 DIAGNOSIS — G40.909 SEIZURE DISORDER: ICD-10-CM

## 2024-01-25 RX ORDER — FOLIC ACID 1 MG/1
1000 TABLET ORAL DAILY
Qty: 90 TABLET | Refills: 5 | Status: CANCELLED | OUTPATIENT
Start: 2024-01-25

## 2024-01-25 NOTE — TELEPHONE ENCOUNTER
Elsa called in and stated patient needs refill on folic acid sent to pharmacy today please. Also, she is requesting patient have all refills sent in for 1 year.  I explained that I was unaware if provider would do that.  Elsa stated she understood but would really like a year supply sent for 1 year.

## 2024-01-26 DIAGNOSIS — G40.909 SEIZURE DISORDER: ICD-10-CM

## 2024-01-26 RX ORDER — FOLIC ACID 1 MG/1
TABLET ORAL
Qty: 90 TABLET | Refills: 3 | Status: SHIPPED | OUTPATIENT
Start: 2024-01-26

## 2024-01-26 NOTE — TELEPHONE ENCOUNTER
Rx Refill Note  Requested Prescriptions     Pending Prescriptions Disp Refills    folic acid (FOLVITE) 1 MG tablet [Pharmacy Med Name: FOLIC ACID 1 MG TABS 1 Tablet] 90 tablet 5     Sig: TAKE ONE TABLET BY MOUTH DAILY      Last office visit with prescribing clinician:  1/24/24    Next office visit with prescribing clinician: Visit date not found     LRX:1/26/23        Ekaterina Hansen MA  01/26/24, 12:28 CST

## 2024-01-26 NOTE — TELEPHONE ENCOUNTER
Refill is a duplicate     Rx Refill Note  Requested Prescriptions     Pending Prescriptions Disp Refills    folic acid (FOLVITE) 1 MG tablet 90 tablet 5     Sig: Take 1 tablet by mouth Daily.      Last office visit with prescribing clinician: Visit date not found   Last telemedicine visit with prescribing clinician: Visit date not found   Next office visit with prescribing clinician: Visit date not found                         Would you like a call back once the refill request has been completed: [] Yes [] No    If the office needs to give you a call back, can they leave a voicemail: [] Yes [] No    Danelle Calles MA  01/26/24, 15:56 CST

## 2024-01-29 RX ORDER — CETIRIZINE HYDROCHLORIDE 10 MG/1
TABLET ORAL
Qty: 90 TABLET | Refills: 3 | Status: SHIPPED | OUTPATIENT
Start: 2024-01-29

## 2024-01-29 NOTE — TELEPHONE ENCOUNTER
Rx Refill Note  Requested Prescriptions     Pending Prescriptions Disp Refills    cetirizine (zyrTEC) 10 MG tablet [Pharmacy Med Name: CETIRIZINE HCL 10 MG TAB 10 Tablet] 90 tablet 3     Sig: TAKE ONE TABLET BY MOUTH DAILY      Last office visit with prescribing clinician: 1/17/2023   Last telemedicine visit with prescribing clinician: Visit date not found   Next office visit with prescribing clinician: Visit date not found                         Would you like a call back once the refill request has been completed: [] Yes [] No    If the office needs to give you a call back, can they leave a voicemail: [] Yes [] No    Sue Trujillo MA  01/29/24, 09:18 CST

## 2024-01-30 LAB
25(OH)D3+25(OH)D2 SERPL-MCNC: 83 NG/ML (ref 30–100)
ALBUMIN SERPL-MCNC: 4.4 G/DL (ref 3.9–4.9)
ALBUMIN/GLOB SERPL: 1.6 {RATIO} (ref 1.2–2.2)
ALP SERPL-CCNC: 70 IU/L (ref 44–121)
ALT SERPL-CCNC: 23 IU/L (ref 0–32)
AST SERPL-CCNC: 19 IU/L (ref 0–40)
BILIRUB SERPL-MCNC: <0.2 MG/DL (ref 0–1.2)
BUN SERPL-MCNC: 20 MG/DL (ref 6–24)
BUN/CREAT SERPL: 35 (ref 9–23)
CALCIUM SERPL-MCNC: 9.8 MG/DL (ref 8.7–10.2)
CHLORIDE SERPL-SCNC: 104 MMOL/L (ref 96–106)
CHOLEST SERPL-MCNC: 169 MG/DL (ref 100–199)
CO2 SERPL-SCNC: 26 MMOL/L (ref 20–29)
CREAT SERPL-MCNC: 0.57 MG/DL (ref 0.57–1)
EGFRCR SERPLBLD CKD-EPI 2021: 113 ML/MIN/1.73
ERYTHROCYTE [DISTWIDTH] IN BLOOD BY AUTOMATED COUNT: 12.9 % (ref 11.7–15.4)
GLOBULIN SER CALC-MCNC: 2.8 G/DL (ref 1.5–4.5)
GLUCOSE SERPL-MCNC: 80 MG/DL (ref 70–99)
HCT VFR BLD AUTO: 39.8 % (ref 34–46.6)
HDLC SERPL-MCNC: 55 MG/DL
HGB BLD-MCNC: 13.3 G/DL (ref 11.1–15.9)
LDLC SERPL CALC-MCNC: 94 MG/DL (ref 0–99)
MCH RBC QN AUTO: 29.8 PG (ref 26.6–33)
MCHC RBC AUTO-ENTMCNC: 33.4 G/DL (ref 31.5–35.7)
MCV RBC AUTO: 89 FL (ref 79–97)
PLATELET # BLD AUTO: 247 X10E3/UL (ref 150–450)
POTASSIUM SERPL-SCNC: 4.4 MMOL/L (ref 3.5–5.2)
PROT SERPL-MCNC: 7.2 G/DL (ref 6–8.5)
RBC # BLD AUTO: 4.46 X10E6/UL (ref 3.77–5.28)
SODIUM SERPL-SCNC: 144 MMOL/L (ref 134–144)
TRIGL SERPL-MCNC: 111 MG/DL (ref 0–149)
TSH SERPL DL<=0.005 MIU/L-ACNC: 1.97 UIU/ML (ref 0.45–4.5)
VLDLC SERPL CALC-MCNC: 20 MG/DL (ref 5–40)
WBC # BLD AUTO: 5.9 X10E3/UL (ref 3.4–10.8)

## 2024-02-08 DIAGNOSIS — G40.909 SEIZURE DISORDER: ICD-10-CM

## 2024-02-12 RX ORDER — LACOSAMIDE 100 MG/1
TABLET ORAL
Qty: 30 TABLET | Refills: 5 | Status: SHIPPED | OUTPATIENT
Start: 2024-02-12

## 2024-02-16 DIAGNOSIS — R56.9 GENERALIZED CONVULSIVE SEIZURES: ICD-10-CM

## 2024-02-16 RX ORDER — DIVALPROEX SODIUM 125 MG/1
CAPSULE, COATED PELLETS ORAL
Qty: 210 CAPSULE | Refills: 5 | Status: SHIPPED | OUTPATIENT
Start: 2024-02-16

## 2024-02-26 RX ORDER — ERGOCALCIFEROL 1.25 MG/1
50000 CAPSULE ORAL WEEKLY
Qty: 12 CAPSULE | Refills: 3 | Status: SHIPPED | OUTPATIENT
Start: 2024-02-26

## 2024-02-26 NOTE — TELEPHONE ENCOUNTER
Rx Refill Note  Requested Prescriptions     Pending Prescriptions Disp Refills    vitamin D (ERGOCALCIFEROL) 1.25 MG (79280 UT) capsule capsule [Pharmacy Med Name: VIT D2 1.25 MG (50,000 UNIT 38960 Capsule] 5 capsule 5     Sig: Take 1 capsule by mouth 1 (One) Time Per Week.      Last office visit with prescribing clinician: 1/17/2023   Next office visit with prescribing clinician: Visit date not found                         Would you like a call back once the refill request has been completed: [] Yes [] No    If the office needs to give you a call back, can they leave a voicemail: [] Yes [] No    Danelle Calles MA  02/26/24, 14:37 CST

## 2024-03-29 DIAGNOSIS — G40.909 SEIZURE DISORDER: ICD-10-CM

## 2024-03-29 RX ORDER — LACOSAMIDE 200 MG/1
TABLET ORAL
Qty: 60 TABLET | Refills: 5 | OUTPATIENT
Start: 2024-03-29

## 2024-03-29 NOTE — TELEPHONE ENCOUNTER
Refill too soon     Rx Refill Note  Requested Prescriptions     Pending Prescriptions Disp Refills    lacosamide (VIMPAT) 200 MG tablet [Pharmacy Med Name: LACOSAMIDE 200 MG TABS 200 Tablet] 60 tablet 5     Sig: TAKE ONE TABLET BY MOUTH TWICE A DAY      Last office visit with prescribing clinician: 1/17/2023   Next office visit with prescribing clinician: Visit date not found                         Would you like a call back once the refill request has been completed: [] Yes [] No    If the office needs to give you a call back, can they leave a voicemail: [] Yes [] No    Danelle Calles MA  03/29/24, 12:48 CDT

## 2024-04-26 DIAGNOSIS — G40.909 SEIZURE DISORDER: ICD-10-CM

## 2024-04-26 RX ORDER — LACOSAMIDE 200 MG/1
TABLET ORAL
Qty: 60 TABLET | Refills: 5 | Status: SHIPPED | OUTPATIENT
Start: 2024-04-26

## 2024-04-26 NOTE — TELEPHONE ENCOUNTER
Elsa Alejandra called and said patient will need a lacosamide refill today sent to [facility] because patient will be out of her medication this weekend.

## 2024-04-26 NOTE — TELEPHONE ENCOUNTER
Rx Refill Note  Requested Prescriptions     Pending Prescriptions Disp Refills    lacosamide (VIMPAT) 200 MG tablet [Pharmacy Med Name: LACOSAMIDE 200 MG TABS 200 Tablet] 60 tablet 5     Sig: TAKE ONE TABLET BY MOUTH TWICE A DAY      Last office visit with prescribing clinician: 1/17/2023   Last telemedicine visit with prescribing clinician: Visit date not found   Next office visit with prescribing clinician: Visit date not found                         Would you like a call back once the refill request has been completed: [] Yes [] No    If the office needs to give you a call back, can they leave a voicemail: [] Yes [] No    Lisa Farrar LPN  04/26/24, 12:31 CDT

## 2024-05-16 DIAGNOSIS — E03.9 ACQUIRED HYPOTHYROIDISM: ICD-10-CM

## 2024-05-16 RX ORDER — LEVOTHYROXINE SODIUM 0.03 MG/1
TABLET ORAL
Qty: 90 TABLET | Refills: 1 | Status: SHIPPED | OUTPATIENT
Start: 2024-05-16

## 2024-07-15 DIAGNOSIS — R56.9 GENERALIZED CONVULSIVE SEIZURES: ICD-10-CM

## 2024-07-15 RX ORDER — DIVALPROEX SODIUM 125 MG/1
CAPSULE, COATED PELLETS ORAL
Qty: 210 CAPSULE | Refills: 5 | Status: SHIPPED | OUTPATIENT
Start: 2024-07-15

## 2024-07-15 NOTE — TELEPHONE ENCOUNTER
Rx Refill Note  Requested Prescriptions     Pending Prescriptions Disp Refills    Divalproex Sodium (DEPAKOTE SPRINKLE) 125 MG capsule [Pharmacy Med Name: DIVALPROEX SODIUM 125 MG  Capsule Delayed Release Sprinkle] 210 capsule 5     Sig: TAKE THREE CAPSULES BY MOUTH EVERY MORNING AND FOUR CAPSULES BY MOUTH EVERY NIGHT AT BEDTIME      Last office visit with prescribing clinician: 1/17/2023   Last telemedicine visit with prescribing clinician: Visit date not found   Next office visit with prescribing clinician: Visit date not found                         Would you like a call back once the refill request has been completed: [] Yes [] No    If the office needs to give you a call back, can they leave a voicemail: [] Yes [] No    Sue Trujillo MA  07/15/24, 10:12 CDT

## 2024-09-03 DIAGNOSIS — G40.909 SEIZURE DISORDER: ICD-10-CM

## 2024-09-04 ENCOUNTER — TELEPHONE (OUTPATIENT)
Dept: FAMILY MEDICINE CLINIC | Facility: CLINIC | Age: 49
End: 2024-09-04
Payer: MEDICAID

## 2024-09-04 RX ORDER — LACOSAMIDE 100 MG/1
TABLET ORAL
Qty: 30 TABLET | Refills: 5 | Status: SHIPPED | OUTPATIENT
Start: 2024-09-04

## 2024-09-04 NOTE — TELEPHONE ENCOUNTER
Rx Refill Note  Requested Prescriptions     Pending Prescriptions Disp Refills    lacosamide (VIMPAT) 100 MG tablet tablet [Pharmacy Med Name: LACOSAMIDE 100 MG TABS 100 Tablet] 30 tablet 5     Sig: TAKE ONE TABLET BY MOUTH NIGHTLY AT BEDTIME.      Last office visit with prescribing clinician: 1/24/24    Next office visit with prescribing clinician: not scheduled     LRX:2/12/24-6 months         Ekaterina Hansen MA  09/04/24, 11:44 CDT

## 2024-09-04 NOTE — TELEPHONE ENCOUNTER
Patient is out of lacosamide (VIMPAT) 100 MG tablet tablet. Care giver stated she had reached out to pharmacy but they have not received order for refill yet.

## 2024-09-23 DIAGNOSIS — G40.909 SEIZURE DISORDER: ICD-10-CM

## 2024-09-23 RX ORDER — LACOSAMIDE 200 MG/1
TABLET ORAL
Qty: 60 TABLET | Refills: 5 | Status: SHIPPED | OUTPATIENT
Start: 2024-09-23

## 2024-09-25 ENCOUNTER — TELEPHONE (OUTPATIENT)
Dept: FAMILY MEDICINE CLINIC | Facility: CLINIC | Age: 49
End: 2024-09-25

## 2024-09-25 ENCOUNTER — OFFICE VISIT (OUTPATIENT)
Dept: FAMILY MEDICINE CLINIC | Facility: CLINIC | Age: 49
End: 2024-09-25
Payer: MEDICAID

## 2024-09-25 VITALS
BODY MASS INDEX: 19.99 KG/M2 | TEMPERATURE: 98.6 F | OXYGEN SATURATION: 96 % | HEIGHT: 65 IN | SYSTOLIC BLOOD PRESSURE: 129 MMHG | DIASTOLIC BLOOD PRESSURE: 86 MMHG | RESPIRATION RATE: 20 BRPM | HEART RATE: 80 BPM | WEIGHT: 120 LBS

## 2024-09-25 DIAGNOSIS — S99.921A RIGHT FOOT INJURY, INITIAL ENCOUNTER: Primary | ICD-10-CM

## 2024-09-25 DIAGNOSIS — R26.9 GAIT ABNORMALITY: ICD-10-CM

## 2024-09-25 PROCEDURE — 99213 OFFICE O/P EST LOW 20 MIN: CPT | Performed by: NURSE PRACTITIONER

## 2024-09-25 PROCEDURE — 1160F RVW MEDS BY RX/DR IN RCRD: CPT | Performed by: NURSE PRACTITIONER

## 2024-09-25 PROCEDURE — 1126F AMNT PAIN NOTED NONE PRSNT: CPT | Performed by: NURSE PRACTITIONER

## 2024-09-25 PROCEDURE — 1159F MED LIST DOCD IN RCRD: CPT | Performed by: NURSE PRACTITIONER

## 2024-12-18 DIAGNOSIS — E55.9 VITAMIN D DEFICIENCY: Primary | ICD-10-CM

## 2024-12-18 NOTE — TELEPHONE ENCOUNTER
Rx Refill Note  Requested Prescriptions     Pending Prescriptions Disp Refills    vitamin D (ERGOCALCIFEROL) 1.25 MG (90322 UT) capsule capsule [Pharmacy Med Name: VIT D2 1.25 MG (50,000 UNIT 57378 Capsule] 12 capsule 3     Sig: Take 1 capsule by mouth 1 (One) Time Per Week.      Last office visit with prescribing clinician: 1/17/2023   Last telemedicine visit with prescribing clinician: Visit date not found   Next office visit with prescribing clinician: 1/24/2025                         Would you like a call back once the refill request has been completed: [] Yes [] No    If the office needs to give you a call back, can they leave a voicemail: [] Yes [] No    Sue Trujillo MA  12/18/24, 09:30 CST

## 2024-12-19 RX ORDER — ERGOCALCIFEROL 1.25 MG/1
50000 CAPSULE, LIQUID FILLED ORAL WEEKLY
Qty: 12 CAPSULE | Refills: 3 | Status: SHIPPED | OUTPATIENT
Start: 2024-12-19

## 2025-01-14 DIAGNOSIS — R56.9 GENERALIZED CONVULSIVE SEIZURES: ICD-10-CM

## 2025-01-14 NOTE — TELEPHONE ENCOUNTER
Rx Refill Note  Requested Prescriptions     Pending Prescriptions Disp Refills    Divalproex Sodium (DEPAKOTE SPRINKLE) 125 MG capsule [Pharmacy Med Name: DIVALPROEX SODIUM 125 MG  Capsule Delayed Release Sprinkle] 210 capsule 5     Sig: TAKE THREE CAPSULES BY MOUTH EVERY MORNING AND FOUR CAPSULES BY MOUTH EVERY NIGHT AT BEDTIME      Last office visit with prescribing clinician: 1/17/2023   Next office visit with prescribing clinician: 1/24/2025       Alix Jett CMA  01/14/25, 16:32 CST

## 2025-01-16 RX ORDER — DIVALPROEX SODIUM 125 MG/1
CAPSULE, COATED PELLETS ORAL
Qty: 210 CAPSULE | Refills: 5 | Status: SHIPPED | OUTPATIENT
Start: 2025-01-16

## 2025-01-24 DIAGNOSIS — G40.909 SEIZURE DISORDER: ICD-10-CM

## 2025-01-24 RX ORDER — FOLIC ACID 1 MG/1
TABLET ORAL
Qty: 90 TABLET | Refills: 3 | Status: SHIPPED | OUTPATIENT
Start: 2025-01-24 | End: 2025-01-24 | Stop reason: SDUPTHER

## 2025-01-24 RX ORDER — FOLIC ACID 1 MG/1
1000 TABLET ORAL DAILY
Qty: 90 TABLET | Refills: 3 | Status: SHIPPED | OUTPATIENT
Start: 2025-01-24

## 2025-01-24 NOTE — TELEPHONE ENCOUNTER
Caller: Nely Abbasi KASANDRA    Relationship: Self    Best call back number: [phone]    Requested Prescriptions:   Requested Prescriptions     Pending Prescriptions Disp Refills    folic acid (FOLVITE) 1 MG tablet 90 tablet 3     Sig: Take 1 tablet by mouth Daily.        Pharmacy where request should be sent: [facility] - [place], [place] - [address] - [phone]  - [phone]     Last office visit with prescribing clinician: Visit date not found   Last telemedicine visit with prescribing clinician: Visit date not found   Next office visit with prescribing clinician: 1/27/2025     Additional details provided by patient: PATIENT ALSO REQUESTING A COPY OF THIS PRESCRIPTION BE FAXED [phone]    Does the patient have less than a 3 day supply:  [] Yes  [] No    Would you like a call back once the refill request has been completed: [] Yes [] No    If the office needs to give you a call back, can they leave a voicemail: [] Yes [] No    Ry Maldonado Rep   01/24/25 15:17 CST

## 2025-01-27 ENCOUNTER — OFFICE VISIT (OUTPATIENT)
Dept: FAMILY MEDICINE CLINIC | Facility: CLINIC | Age: 50
End: 2025-01-27
Payer: MEDICAID

## 2025-01-27 VITALS
HEIGHT: 65 IN | HEART RATE: 83 BPM | SYSTOLIC BLOOD PRESSURE: 118 MMHG | WEIGHT: 101 LBS | TEMPERATURE: 97.8 F | DIASTOLIC BLOOD PRESSURE: 79 MMHG | RESPIRATION RATE: 18 BRPM | OXYGEN SATURATION: 97 % | BODY MASS INDEX: 16.83 KG/M2

## 2025-01-27 DIAGNOSIS — Z00.00 WELL ADULT EXAM: Primary | ICD-10-CM

## 2025-01-27 DIAGNOSIS — R56.9 GENERALIZED CONVULSIVE SEIZURES: ICD-10-CM

## 2025-01-27 DIAGNOSIS — E55.9 VITAMIN D DEFICIENCY: ICD-10-CM

## 2025-01-27 DIAGNOSIS — Z13.220 SCREENING FOR LIPID DISORDERS: ICD-10-CM

## 2025-01-27 DIAGNOSIS — G40.909 SEIZURE DISORDER: ICD-10-CM

## 2025-01-27 DIAGNOSIS — Z13.0 SCREENING FOR DEFICIENCY ANEMIA: ICD-10-CM

## 2025-01-27 DIAGNOSIS — Z13.1 DIABETES MELLITUS SCREENING: ICD-10-CM

## 2025-01-27 DIAGNOSIS — F81.89 DEVELOPMENTAL NON-VERBAL DISORDER: ICD-10-CM

## 2025-01-27 DIAGNOSIS — E03.9 ACQUIRED HYPOTHYROIDISM: ICD-10-CM

## 2025-01-27 PROCEDURE — 99396 PREV VISIT EST AGE 40-64: CPT | Performed by: FAMILY MEDICINE

## 2025-01-27 PROCEDURE — 1126F AMNT PAIN NOTED NONE PRSNT: CPT | Performed by: FAMILY MEDICINE

## 2025-01-27 RX ORDER — LACOSAMIDE 100 MG/1
TABLET ORAL
Qty: 30 TABLET | Refills: 5 | Status: SHIPPED | OUTPATIENT
Start: 2025-01-27

## 2025-01-27 RX ORDER — LACOSAMIDE 200 MG/1
1 TABLET ORAL 2 TIMES DAILY
Qty: 60 TABLET | Refills: 5 | Status: SHIPPED | OUTPATIENT
Start: 2025-01-27

## 2025-01-27 RX ORDER — LEVOTHYROXINE SODIUM 25 UG/1
25 TABLET ORAL DAILY
Qty: 90 TABLET | Refills: 1 | Status: SHIPPED | OUTPATIENT
Start: 2025-01-27

## 2025-01-27 RX ORDER — CETIRIZINE HYDROCHLORIDE 10 MG/1
TABLET ORAL
Qty: 30 TABLET | Refills: 3 | Status: SHIPPED | OUTPATIENT
Start: 2025-01-27

## 2025-01-27 NOTE — PROGRESS NOTES
"Subjective   Nely Abbasi is a 49 y.o. female.     History of Present Illness  The patient presents for a physical exam.    History is reported by other person in the presence of the patient.  It is reported that she has been experiencing significant weight loss, with a total of 19 pounds lost to date. Despite this, she maintains a healthy appetite and regular bowel movements. She does not have any dental issues.    She has not experienced any breakthrough seizures or neurological changes. Her current medication regimen includes Vimpat 200 mg twice daily and 100 mg at bedtime, along with Depakote, 3 capsules in the morning and 4 at night.    She is on a vitamin D supplement for low vitamin D.    She is on Synthroid for her thyroid condition.    She occasionally uses the bathroom independently but typically relies on pull-ups for continence management. There is no evidence of skin breakdown in the groin or buttock areas. She remains active during the day, engaging in regular walking exercises.    MEDICATIONS  Current: Vitamin D supplement, Synthroid, Vimpat, Depakote    IMMUNIZATIONS  She declined influenza and tetanus vaccines.    Results  Laboratory Studies  Labs done last year showed improved vitamin D level, controlled cholesterol numbers, normal thyroid levels, normal kidney function, normal liver numbers, and normal blood counts.    The following portions of the patient's history were reviewed and updated as appropriate: allergies, current medications, past family history, past medical history, past social history, past surgical history, and problem list.        A review of systems was performed, and pertinent findings are noted in the HPI.    Objective   /79 (BP Location: Right arm, Patient Position: Sitting, Cuff Size: Adult)   Pulse 83   Temp 97.8 °F (36.6 °C) (Infrared)   Resp 18   Ht 165.1 cm (65\")   Wt 45.8 kg (101 lb)   SpO2 97%   BMI 16.81 kg/m²    BMI is below normal parameters " (malnutrition). Recommendations: treating the underlying disease process and Information on healthy weight added to patient's after visit summary     Physical Exam  Vitals and nursing note reviewed.   Constitutional:       General: She is not in acute distress.     Appearance: She is well-developed and underweight. She is ill-appearing. She is not diaphoretic.   HENT:      Head: Normocephalic and atraumatic.      Right Ear: Tympanic membrane, ear canal and external ear normal.      Left Ear: Tympanic membrane, ear canal and external ear normal.      Nose: Nose normal.   Eyes:      General:         Right eye: No discharge.         Left eye: No discharge.      Extraocular Movements:      Right eye: Abnormal extraocular motion and nystagmus present.      Left eye: Abnormal extraocular motion and nystagmus present.      Conjunctiva/sclera: Conjunctivae normal.   Neck:      Thyroid: No thyromegaly.      Trachea: No tracheal deviation.   Cardiovascular:      Rate and Rhythm: Normal rate and regular rhythm.      Heart sounds: Normal heart sounds.   Pulmonary:      Effort: Pulmonary effort is normal. No respiratory distress.      Breath sounds: Normal breath sounds. No stridor. No wheezing.   Chest:      Chest wall: No tenderness.   Abdominal:      General: There is no distension.      Palpations: Abdomen is soft.      Tenderness: There is no abdominal tenderness.   Musculoskeletal:         General: Normal range of motion.      Cervical back: Normal range of motion.   Lymphadenopathy:      Cervical: No cervical adenopathy.   Skin:     General: Skin is warm and dry.   Neurological:      Mental Status: She is alert. Mental status is at baseline.      Motor: No abnormal muscle tone.   Psychiatric:         Mood and Affect: Mood is anxious.         Speech: She is noncommunicative.         Behavior: Behavior is cooperative.         Cognition and Memory: Cognition is impaired.         Judgment: Judgment normal.         Assessment  & Plan   Problems Addressed this Visit          Endocrine and Metabolic    Hypothyroidism    Relevant Orders    TSH    T4, free       Neuro    Generalized convulsive seizures    Relevant Orders    Valproic Acid Level, Free    Valproic Acid Level, Total    Developmental non-verbal disorder     Other Visit Diagnoses       Well adult exam    -  Primary    Vitamin D deficiency        Relevant Orders    Vitamin D 25 hydroxy    Screening for lipid disorders        Relevant Orders    Lipid panel    Screening for deficiency anemia        Relevant Orders    CBC No Differential    Diabetes mellitus screening        Relevant Orders    Comprehensive metabolic panel          Diagnoses         Codes Comments    Well adult exam    -  Primary ICD-10-CM: Z00.00  ICD-9-CM: V70.0     Generalized convulsive seizures     ICD-10-CM: R56.9  ICD-9-CM: 780.39     Developmental non-verbal disorder     ICD-10-CM: F81.89  ICD-9-CM: 315.2     Vitamin D deficiency     ICD-10-CM: E55.9  ICD-9-CM: 268.9     Acquired hypothyroidism     ICD-10-CM: E03.9  ICD-9-CM: 244.9     Screening for lipid disorders     ICD-10-CM: Z13.220  ICD-9-CM: V77.91     Screening for deficiency anemia     ICD-10-CM: Z13.0  ICD-9-CM: V78.1     Diabetes mellitus screening     ICD-10-CM: Z13.1  ICD-9-CM: V77.1             Assessment & Plan  1. Weight loss.  Her weight has fluctuated over the past few years, with a noted decrease from 120 pounds in September 2024 to 101 pounds today. However, previous records indicate her weight was 105 pounds in January 2024 and 101 pounds in January 2023, suggesting the September measurement may have been inaccurate. Her BMI is currently 16, which is low. It is recommended to monitor her weight at home a few times per week. She should continue to eat a balanced diet rich in protein and consider protein shakes or nutritional supplements if she becomes selective with her food intake.    2. Seizure disorder.  She is currently on Vimpat 200 mg  twice a day and 100 mg at bedtime, as well as Depakote 3 capsules in the morning and 4 capsules at night. No breakthrough seizures have been reported. Her Depakote levels will be checked as part of her lab work. As long as her seizures remain controlled, no changes to her medication regimen will be made.    3. Vitamin D deficiency.  She is on a vitamin D supplement due to previously low levels. Her vitamin D levels had improved significantly since starting the supplement. Her vitamin D levels will be checked as part of her lab work.    4. Thyroid management.  She is on Synthroid for thyroid management. Her thyroid levels were previously normal. Her TSH and free T4 levels will be checked as part of her lab work. She will continue her Synthroid prescription.    5. Health maintenance.  She has never had a Pap smear, mammogram, or colon cancer screening. These were offered but declined. Her vitals are normal. Her lipid panel will be checked as part of her lab work. She declined the influenza and tetanus vaccines today. She will continue to use [facility] for her medications.    Follow-up  The patient will follow up in 1 year for a physical exam or sooner if needed.               Transcribed from ambient dictation for Kylie Higuera MD by Kylie Higuera MD.  01/27/25   14:00 CST    Patient or patient representative verbalized consent for the use of Ambient Listening during the visit with  Kylie Higuera MD for chart documentation. 1/27/2025  14:06 CST          This document has been electronically signed by Kylie Higuera MD on January 27, 2025 14:07 CST

## 2025-01-27 NOTE — TELEPHONE ENCOUNTER
Rx Refill Note  Requested Prescriptions     Pending Prescriptions Disp Refills    cetirizine (zyrTEC) 10 MG tablet [Pharmacy Med Name: CETIRIZINE HCL 10 MG TAB 10 Tablet] 30 tablet 3     Sig: TAKE ONE TABLET BY MOUTH DAILY      Last office visit with prescribing clinician: Visit date not found   Last telemedicine visit with prescribing clinician: Visit date not found   Next office visit with prescribing clinician: 1/27/2025                         Would you like a call back once the refill request has been completed: [] Yes [] No    If the office needs to give you a call back, can they leave a voicemail: [] Yes [] No    Lisa Farrar LPN  01/27/25, 11:19 CST

## 2025-02-17 LAB
25(OH)D3+25(OH)D2 SERPL-MCNC: 78.6 NG/ML (ref 30–100)
ALBUMIN SERPL-MCNC: 4.1 G/DL (ref 3.5–5.2)
ALBUMIN/GLOB SERPL: 1.3 G/DL
ALP SERPL-CCNC: 62 U/L (ref 39–117)
ALT SERPL-CCNC: 7 U/L (ref 1–33)
AST SERPL-CCNC: 12 U/L (ref 1–32)
BILIRUB SERPL-MCNC: <0.2 MG/DL (ref 0–1.2)
BUN SERPL-MCNC: 23 MG/DL (ref 6–20)
BUN/CREAT SERPL: 39 (ref 7–25)
CALCIUM SERPL-MCNC: 9.2 MG/DL (ref 8.6–10.5)
CHLORIDE SERPL-SCNC: 110 MMOL/L (ref 98–107)
CHOLEST SERPL-MCNC: 187 MG/DL (ref 0–200)
CO2 SERPL-SCNC: 26.3 MMOL/L (ref 22–29)
CREAT SERPL-MCNC: 0.59 MG/DL (ref 0.57–1)
EGFRCR SERPLBLD CKD-EPI 2021: 111.3 ML/MIN/1.73
ERYTHROCYTE [DISTWIDTH] IN BLOOD BY AUTOMATED COUNT: 13.5 % (ref 12.3–15.4)
GLOBULIN SER CALC-MCNC: 3.2 GM/DL
GLUCOSE SERPL-MCNC: 97 MG/DL (ref 65–99)
HCT VFR BLD AUTO: 38.8 % (ref 34–46.6)
HDLC SERPL-MCNC: 50 MG/DL (ref 40–60)
HGB BLD-MCNC: 12.5 G/DL (ref 12–15.9)
LDLC SERPL CALC-MCNC: 122 MG/DL (ref 0–100)
MCH RBC QN AUTO: 28.6 PG (ref 26.6–33)
MCHC RBC AUTO-ENTMCNC: 32.2 G/DL (ref 31.5–35.7)
MCV RBC AUTO: 88.8 FL (ref 79–97)
PLATELET # BLD AUTO: 356 10*3/MM3 (ref 140–450)
POTASSIUM SERPL-SCNC: 4.5 MMOL/L (ref 3.5–5.2)
PROT SERPL-MCNC: 7.3 G/DL (ref 6–8.5)
RBC # BLD AUTO: 4.37 10*6/MM3 (ref 3.77–5.28)
SODIUM SERPL-SCNC: 145 MMOL/L (ref 136–145)
T4 FREE SERPL-MCNC: 1.08 NG/DL (ref 0.92–1.68)
TRIGL SERPL-MCNC: 82 MG/DL (ref 0–150)
TSH SERPL DL<=0.005 MIU/L-ACNC: 2.44 UIU/ML (ref 0.27–4.2)
VALPROATE FREE SERPL-MCNC: 15.9 UG/ML (ref 6–22)
VALPROATE SERPL-MCNC: 87 MCG/ML (ref 50–125)
VLDLC SERPL CALC-MCNC: 15 MG/DL (ref 5–40)
WBC # BLD AUTO: 5.16 10*3/MM3 (ref 3.4–10.8)

## 2025-02-21 ENCOUNTER — TELEPHONE (OUTPATIENT)
Dept: FAMILY MEDICINE CLINIC | Facility: CLINIC | Age: 50
End: 2025-02-21
Payer: MEDICAID

## 2025-02-21 NOTE — TELEPHONE ENCOUNTER
Pt foster mother notified of lab results. Pt has no voiced complaints and no questions at this time.

## 2025-02-21 NOTE — TELEPHONE ENCOUNTER
----- Message from Kylie Higuera sent at 2/19/2025  4:04 PM CST -----  Labs normal expect mildly elevated LDL - rec low cholesterol diet

## 2025-03-21 DIAGNOSIS — G40.909 SEIZURE DISORDER: ICD-10-CM

## 2025-03-21 RX ORDER — LACOSAMIDE 200 MG/1
1 TABLET ORAL EVERY 12 HOURS SCHEDULED
Qty: 60 TABLET | Refills: 5 | OUTPATIENT
Start: 2025-03-21

## 2025-05-15 DIAGNOSIS — E03.9 ACQUIRED HYPOTHYROIDISM: ICD-10-CM

## 2025-05-19 NOTE — TELEPHONE ENCOUNTER
Rx Refill Note  Requested Prescriptions     Pending Prescriptions Disp Refills    levothyroxine (SYNTHROID, LEVOTHROID) 25 MCG tablet [Pharmacy Med Name: LEVOTHYROXINE SODIUM 25 MCG 25 Tablet] 90 tablet 2     Sig: Take 1 tablet by mouth Daily.      Last office visit with prescribing clinician: 1/27/2025   Last telemedicine visit with prescribing clinician: Visit date not found   Next office visit with prescribing clinician: 1/30/2026       Sue Trujillo MA  05/19/25, 09:15 CDT

## 2025-05-22 RX ORDER — LEVOTHYROXINE SODIUM 25 UG/1
25 TABLET ORAL DAILY
Qty: 90 TABLET | Refills: 1 | Status: SHIPPED | OUTPATIENT
Start: 2025-05-22

## 2025-05-29 RX ORDER — CETIRIZINE HYDROCHLORIDE 10 MG/1
10 TABLET ORAL DAILY
Qty: 30 TABLET | Refills: 4 | Status: SHIPPED | OUTPATIENT
Start: 2025-05-29

## 2025-07-14 DIAGNOSIS — R56.9 GENERALIZED CONVULSIVE SEIZURES: ICD-10-CM

## 2025-07-16 RX ORDER — DIVALPROEX SODIUM 125 MG/1
CAPSULE, COATED PELLETS ORAL
Qty: 210 CAPSULE | Refills: 5 | Status: SHIPPED | OUTPATIENT
Start: 2025-07-16

## 2025-07-16 NOTE — TELEPHONE ENCOUNTER
Rx Refill Note  Requested Prescriptions     Pending Prescriptions Disp Refills    Divalproex Sodium (DEPAKOTE SPRINKLE) 125 MG capsule [Pharmacy Med Name: DIVALPROEX SODIUM 125 MG  Capsule Delayed Release Sprinkle] 210 capsule 5     Sig: TAKE THREE CAPSULES BY MOUTH EVERY MORNING AND FOUR CAPSULES BY MOUTH EVERY NIGHT AT BEDTIME      Last office visit with prescribing clinician: 1/27/2025   Next office visit with prescribing clinician: 1/30/2026     Danelle Holguin MA  07/16/25, 16:10 CDT

## 2025-08-01 DIAGNOSIS — G40.909 SEIZURE DISORDER: ICD-10-CM

## 2025-08-04 RX ORDER — LACOSAMIDE 100 MG/1
100 TABLET ORAL
Qty: 30 TABLET | Refills: 5 | Status: SHIPPED | OUTPATIENT
Start: 2025-08-04

## 2025-08-04 RX ORDER — LACOSAMIDE 100 MG/1
TABLET ORAL
Qty: 90 TABLET | Refills: 1 | OUTPATIENT
Start: 2025-08-04

## 2025-08-04 RX ORDER — LACOSAMIDE 200 MG/1
1 TABLET ORAL 2 TIMES DAILY
Qty: 60 TABLET | Refills: 5 | Status: SHIPPED | OUTPATIENT
Start: 2025-08-04

## 2025-08-04 RX ORDER — LACOSAMIDE 200 MG/1
1 TABLET ORAL 2 TIMES DAILY
Qty: 180 TABLET | Refills: 1 | OUTPATIENT
Start: 2025-08-04